# Patient Record
Sex: FEMALE | Race: WHITE
[De-identification: names, ages, dates, MRNs, and addresses within clinical notes are randomized per-mention and may not be internally consistent; named-entity substitution may affect disease eponyms.]

---

## 2018-03-14 ENCOUNTER — HOSPITAL ENCOUNTER (EMERGENCY)
Age: 53
Discharge: HOME | End: 2018-03-14
Payer: COMMERCIAL

## 2018-03-14 VITALS — BODY MASS INDEX: 33.8 KG/M2

## 2018-03-14 VITALS
SYSTOLIC BLOOD PRESSURE: 121 MMHG | RESPIRATION RATE: 16 BRPM | DIASTOLIC BLOOD PRESSURE: 72 MMHG | OXYGEN SATURATION: 98 % | HEART RATE: 84 BPM

## 2018-03-14 VITALS
SYSTOLIC BLOOD PRESSURE: 162 MMHG | HEART RATE: 99 BPM | RESPIRATION RATE: 18 BRPM | OXYGEN SATURATION: 100 % | DIASTOLIC BLOOD PRESSURE: 130 MMHG | TEMPERATURE: 97.3 F

## 2018-03-14 VITALS — SYSTOLIC BLOOD PRESSURE: 128 MMHG | DIASTOLIC BLOOD PRESSURE: 89 MMHG

## 2018-03-14 DIAGNOSIS — M77.32: ICD-10-CM
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
## 2018-03-14 DIAGNOSIS — S92.355A: Primary | ICD-10-CM
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
## 2018-03-14 DIAGNOSIS — Z79.899: ICD-10-CM

## 2018-03-14 DIAGNOSIS — M19.072: ICD-10-CM

## 2018-03-14 DIAGNOSIS — G43.909: ICD-10-CM
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
## 2018-03-14 PROCEDURE — 99283 EMERGENCY DEPT VISIT LOW MDM: CPT

## 2018-03-14 PROCEDURE — 73630 X-RAY EXAM OF FOOT: CPT

## 2018-04-27 ENCOUNTER — HOSPITAL ENCOUNTER (OUTPATIENT)
Age: 53
End: 2018-04-27
Payer: COMMERCIAL

## 2018-04-27 DIAGNOSIS — F17.200: ICD-10-CM

## 2018-04-27 DIAGNOSIS — Z01.818: Primary | ICD-10-CM

## 2018-04-27 DIAGNOSIS — Z01.810: ICD-10-CM

## 2018-04-27 LAB
ANION GAP: 7 (ref 5–15)
BUN SERPL-MCNC: 12 MG/DL (ref 7–18)
BUN/CREAT RATIO: 18.1 RATIO (ref 10–20)
CALCIUM SERPL-MCNC: 9 MG/DL (ref 8.5–10.1)
CARBON DIOXIDE: 26 MMOL/L (ref 21–32)
CHLORIDE: 103 MMOL/L (ref 98–107)
DEPRECATED RDW RBC: 43.4 FL (ref 35.1–43.9)
DIFFERENTIAL INDICATED: (no result)
ERYTHROCYTE [DISTWIDTH] IN BLOOD: 12.9 % (ref 11.6–14.6)
EST GLOM FILT RATE - AFR AMER: 119 ML/MIN (ref 60–?)
GLUCOSE: 85 MG/DL (ref 74–106)
HCT VFR BLD AUTO: 39.9 % (ref 37–47)
HEMOGLOBIN: 13.5 G/DL (ref 12–15)
HGB BLD-MCNC: 13.5 G/DL (ref 12–15)
IMMATURE GRANULOCYTES COUNT: 0 X10^3/UL (ref 0–0)
MCV RBC: 93.4 FL (ref 81–99)
MEAN CORP HGB CONC: 33.8 G/GL (ref 32–36)
MEAN PLATELET VOL.: 10.5 FL (ref 6.2–12)
PLATELET # BLD: 230 K/MM3 (ref 150–450)
PLATELET COUNT: 230 K/MM3 (ref 150–450)
POSITIVE COUNT: NO
POSITIVE DIFFERENTIAL: NO
POSITIVE MORPHOLOGY: NO
POTASSIUM: 4.3 MMOL/L (ref 3.5–5.1)
RBC # BLD AUTO: 4.27 M/MM3 (ref 4.2–5.4)
RBC DISTRIBUTION WIDTH CV: 12.9 % (ref 11.6–14.6)
RBC DISTRIBUTION WIDTH SD: 43.4 FL (ref 35.1–43.9)
WBC # BLD AUTO: 4.1 K/MM3 (ref 4.4–11)
WHITE BLOOD COUNT: 4.1 K/MM3 (ref 4.4–11)

## 2018-04-27 PROCEDURE — 36415 COLL VENOUS BLD VENIPUNCTURE: CPT

## 2018-04-27 PROCEDURE — 80048 BASIC METABOLIC PNL TOTAL CA: CPT

## 2018-04-27 PROCEDURE — 93005 ELECTROCARDIOGRAM TRACING: CPT

## 2018-04-27 PROCEDURE — 85025 COMPLETE CBC W/AUTO DIFF WBC: CPT

## 2018-04-27 PROCEDURE — 71046 X-RAY EXAM CHEST 2 VIEWS: CPT

## 2019-08-26 ENCOUNTER — HOSPITAL ENCOUNTER (OUTPATIENT)
Dept: HOSPITAL 100 - ED | Age: 54
Setting detail: OBSERVATION
LOS: 1 days | Discharge: HOME | End: 2019-08-27
Payer: COMMERCIAL

## 2019-08-26 VITALS
RESPIRATION RATE: 25 BRPM | SYSTOLIC BLOOD PRESSURE: 152 MMHG | DIASTOLIC BLOOD PRESSURE: 86 MMHG | OXYGEN SATURATION: 98 % | TEMPERATURE: 99.14 F | HEART RATE: 108 BPM

## 2019-08-26 VITALS — BODY MASS INDEX: 30.1 KG/M2 | BODY MASS INDEX: 29.5 KG/M2

## 2019-08-26 VITALS — HEART RATE: 90 BPM | SYSTOLIC BLOOD PRESSURE: 138 MMHG | DIASTOLIC BLOOD PRESSURE: 71 MMHG

## 2019-08-26 VITALS
SYSTOLIC BLOOD PRESSURE: 116 MMHG | DIASTOLIC BLOOD PRESSURE: 93 MMHG | OXYGEN SATURATION: 99 % | RESPIRATION RATE: 20 BRPM | HEART RATE: 80 BPM

## 2019-08-26 VITALS
RESPIRATION RATE: 16 BRPM | SYSTOLIC BLOOD PRESSURE: 124 MMHG | OXYGEN SATURATION: 98 % | HEART RATE: 80 BPM | DIASTOLIC BLOOD PRESSURE: 92 MMHG | TEMPERATURE: 97.88 F

## 2019-08-26 VITALS
DIASTOLIC BLOOD PRESSURE: 86 MMHG | HEART RATE: 96 BPM | RESPIRATION RATE: 18 BRPM | OXYGEN SATURATION: 99 % | SYSTOLIC BLOOD PRESSURE: 143 MMHG

## 2019-08-26 VITALS
OXYGEN SATURATION: 98 % | TEMPERATURE: 98.06 F | SYSTOLIC BLOOD PRESSURE: 133 MMHG | RESPIRATION RATE: 18 BRPM | HEART RATE: 72 BPM | DIASTOLIC BLOOD PRESSURE: 79 MMHG

## 2019-08-26 VITALS
DIASTOLIC BLOOD PRESSURE: 71 MMHG | HEART RATE: 90 BPM | SYSTOLIC BLOOD PRESSURE: 138 MMHG | RESPIRATION RATE: 18 BRPM | OXYGEN SATURATION: 99 %

## 2019-08-26 VITALS — HEART RATE: 69 BPM

## 2019-08-26 VITALS — HEART RATE: 72 BPM

## 2019-08-26 VITALS — HEART RATE: 78 BPM

## 2019-08-26 VITALS — OXYGEN SATURATION: 98 %

## 2019-08-26 DIAGNOSIS — R00.2: ICD-10-CM

## 2019-08-26 DIAGNOSIS — R06.2: ICD-10-CM

## 2019-08-26 DIAGNOSIS — G25.81: ICD-10-CM

## 2019-08-26 DIAGNOSIS — G43.909: ICD-10-CM

## 2019-08-26 DIAGNOSIS — R07.89: Primary | ICD-10-CM

## 2019-08-26 DIAGNOSIS — E87.5: ICD-10-CM

## 2019-08-26 DIAGNOSIS — E78.5: ICD-10-CM

## 2019-08-26 DIAGNOSIS — Z79.899: ICD-10-CM

## 2019-08-26 DIAGNOSIS — M19.90: ICD-10-CM

## 2019-08-26 DIAGNOSIS — Z87.891: ICD-10-CM

## 2019-08-26 DIAGNOSIS — Z82.49: ICD-10-CM

## 2019-08-26 LAB
ANION GAP: 4 (ref 5–15)
BUN SERPL-MCNC: 16 MG/DL (ref 7–18)
BUN/CREAT RATIO: 21.6 RATIO (ref 10–20)
CALCIUM SERPL-MCNC: 9.7 MG/DL (ref 8.5–10.1)
CARBON DIOXIDE: 24 MMOL/L (ref 21–32)
CHLORIDE: 111 MMOL/L (ref 98–107)
DEPRECATED RDW RBC: 49.1 FL (ref 35.1–43.9)
ERYTHROCYTE [DISTWIDTH] IN BLOOD: 13.4 % (ref 11.6–14.6)
EST GLOM FILT RATE - AFR AMER: 104 ML/MIN (ref 60–?)
ESTIMATED CREATININE CLEARANCE: 67.14 ML/MIN
GLUCOSE: 92 MG/DL (ref 74–106)
HCT VFR BLD AUTO: 50.5 % (ref 37–47)
HEMOGLOBIN: 16.8 G/DL (ref 12–15)
HGB BLD-MCNC: 16.8 G/DL (ref 12–15)
IMMATURE GRANULOCYTES COUNT: 0.03 X10^3/UL (ref 0–0)
MCV RBC: 96.7 FL (ref 81–99)
MEAN CORP HGB CONC: 33.3 G/DL (ref 32–36)
MEAN PLATELET VOL.: 9.8 FL (ref 6.2–12)
NRBC FLAGGED BY ANALYZER: 0 % (ref 0–5)
PLATELET # BLD: 251 K/MM3 (ref 150–450)
PLATELET COUNT: 251 K/MM3 (ref 150–450)
POTASSIUM: 3.4 MMOL/L (ref 3.5–5.1)
POTASSIUM: 5.5 MMOL/L (ref 3.5–5.1)
RBC # BLD AUTO: 5.22 M/MM3 (ref 4.2–5.4)
RBC DISTRIBUTION WIDTH CV: 13.4 % (ref 11.6–14.6)
RBC DISTRIBUTION WIDTH SD: 49.1 FL (ref 35.1–43.9)
WBC # BLD AUTO: 6.5 K/MM3 (ref 4.4–11)
WHITE BLOOD COUNT: 6.5 K/MM3 (ref 4.4–11)

## 2019-08-26 PROCEDURE — G0378 HOSPITAL OBSERVATION PER HR: HCPCS

## 2019-08-26 PROCEDURE — 96361 HYDRATE IV INFUSION ADD-ON: CPT

## 2019-08-26 PROCEDURE — 84132 ASSAY OF SERUM POTASSIUM: CPT

## 2019-08-26 PROCEDURE — 96372 THER/PROPH/DIAG INJ SC/IM: CPT

## 2019-08-26 PROCEDURE — 96360 HYDRATION IV INFUSION INIT: CPT

## 2019-08-26 PROCEDURE — 93017 CV STRESS TEST TRACING ONLY: CPT

## 2019-08-26 PROCEDURE — 80048 BASIC METABOLIC PNL TOTAL CA: CPT

## 2019-08-26 PROCEDURE — A4216 STERILE WATER/SALINE, 10 ML: HCPCS

## 2019-08-26 PROCEDURE — 85025 COMPLETE CBC W/AUTO DIFF WBC: CPT

## 2019-08-26 PROCEDURE — 99285 EMERGENCY DEPT VISIT HI MDM: CPT

## 2019-08-26 PROCEDURE — 99218: CPT

## 2019-08-26 PROCEDURE — 93005 ELECTROCARDIOGRAM TRACING: CPT

## 2019-08-26 PROCEDURE — A9500 TC99M SESTAMIBI: HCPCS

## 2019-08-26 PROCEDURE — 78452 HT MUSCLE IMAGE SPECT MULT: CPT

## 2019-08-26 PROCEDURE — 80061 LIPID PANEL: CPT

## 2019-08-26 PROCEDURE — 84484 ASSAY OF TROPONIN QUANT: CPT

## 2019-08-26 PROCEDURE — 36415 COLL VENOUS BLD VENIPUNCTURE: CPT

## 2019-08-26 PROCEDURE — 71045 X-RAY EXAM CHEST 1 VIEW: CPT

## 2019-08-26 RX ADMIN — NITROGLYCERIN 0.4 MG: 0.4 TABLET, ORALLY DISINTEGRATING SUBLINGUAL at 16:43

## 2019-08-26 RX ADMIN — SODIUM CHLORIDE 150 ML: 9 INJECTION, SOLUTION INTRAVENOUS at 16:34

## 2019-08-26 RX ADMIN — NITROGLYCERIN 0.4 MG: 0.4 TABLET, ORALLY DISINTEGRATING SUBLINGUAL at 16:35

## 2019-08-26 RX ADMIN — NITROGLYCERIN 0.4 MG: 0.4 TABLET, ORALLY DISINTEGRATING SUBLINGUAL at 16:51

## 2019-08-26 RX ADMIN — NITROGLYCERIN 0.4 MG: 0.4 TABLET, ORALLY DISINTEGRATING SUBLINGUAL at 22:55

## 2019-08-26 RX ADMIN — SODIUM CHLORIDE 100 ML: 9 INJECTION, SOLUTION INTRAVENOUS at 18:17

## 2019-08-27 VITALS
RESPIRATION RATE: 16 BRPM | HEART RATE: 73 BPM | OXYGEN SATURATION: 99 % | DIASTOLIC BLOOD PRESSURE: 72 MMHG | SYSTOLIC BLOOD PRESSURE: 126 MMHG | TEMPERATURE: 97.1 F

## 2019-08-27 VITALS
DIASTOLIC BLOOD PRESSURE: 69 MMHG | SYSTOLIC BLOOD PRESSURE: 121 MMHG | OXYGEN SATURATION: 97 % | HEART RATE: 81 BPM | TEMPERATURE: 97.3 F | RESPIRATION RATE: 14 BRPM

## 2019-08-27 VITALS
SYSTOLIC BLOOD PRESSURE: 121 MMHG | DIASTOLIC BLOOD PRESSURE: 76 MMHG | HEART RATE: 86 BPM | OXYGEN SATURATION: 99 % | TEMPERATURE: 98.24 F | RESPIRATION RATE: 16 BRPM

## 2019-08-27 VITALS — HEART RATE: 97 BPM

## 2019-08-27 VITALS — HEART RATE: 83 BPM

## 2019-08-27 VITALS
TEMPERATURE: 97.88 F | SYSTOLIC BLOOD PRESSURE: 124 MMHG | RESPIRATION RATE: 16 BRPM | OXYGEN SATURATION: 100 % | DIASTOLIC BLOOD PRESSURE: 78 MMHG | HEART RATE: 90 BPM

## 2019-08-27 VITALS — HEART RATE: 65 BPM

## 2019-08-27 LAB
ANION GAP: 6 (ref 5–15)
BUN SERPL-MCNC: 9 MG/DL (ref 7–18)
BUN/CREAT RATIO: 16.4 RATIO (ref 10–20)
CALCIUM SERPL-MCNC: 8.9 MG/DL (ref 8.5–10.1)
CARBON DIOXIDE: 21 MMOL/L (ref 21–32)
CHLORIDE: 118 MMOL/L (ref 98–107)
CHOLEST SERPL-MCNC: 200 MG/DL
DEPRECATED RDW RBC: 48.4 FL (ref 35.1–43.9)
ERYTHROCYTE [DISTWIDTH] IN BLOOD: 13.5 % (ref 11.6–14.6)
EST GLOM FILT RATE - AFR AMER: 147 ML/MIN (ref 60–?)
ESTIMATED CREATININE CLEARANCE: 90.33 ML/MIN
GLUCOSE: 92 MG/DL (ref 74–106)
HCT VFR BLD AUTO: 44.4 % (ref 37–47)
HEMOGLOBIN: 14.7 G/DL (ref 12–15)
HGB BLD-MCNC: 14.7 G/DL (ref 12–15)
IMMATURE GRANULOCYTES COUNT: 0.02 X10^3/UL (ref 0–0)
MCV RBC: 97.4 FL (ref 81–99)
MEAN CORP HGB CONC: 33.1 G/DL (ref 32–36)
MEAN PLATELET VOL.: 9.7 FL (ref 6.2–12)
NRBC FLAGGED BY ANALYZER: 0 % (ref 0–5)
PLATELET # BLD: 219 K/MM3 (ref 150–450)
PLATELET COUNT: 219 K/MM3 (ref 150–450)
POTASSIUM: 4.4 MMOL/L (ref 3.5–5.1)
RBC # BLD AUTO: 4.56 M/MM3 (ref 4.2–5.4)
RBC DISTRIBUTION WIDTH CV: 13.5 % (ref 11.6–14.6)
RBC DISTRIBUTION WIDTH SD: 48.4 FL (ref 35.1–43.9)
TRIGLYCERIDES: 101 MG/DL
VLDLC SERPL-MCNC: 20 MG/DL (ref 5–40)
WBC # BLD AUTO: 5.2 K/MM3 (ref 4.4–11)
WHITE BLOOD COUNT: 5.2 K/MM3 (ref 4.4–11)

## 2019-08-27 RX ADMIN — ASPIRIN 81 MG: 81 TABLET, COATED ORAL at 05:45

## 2019-09-04 ENCOUNTER — HOSPITAL ENCOUNTER (OUTPATIENT)
Age: 54
End: 2019-09-04
Payer: COMMERCIAL

## 2019-09-04 VITALS — BODY MASS INDEX: 29.5 KG/M2

## 2019-09-04 DIAGNOSIS — Z51.81: Primary | ICD-10-CM

## 2019-09-04 LAB
ALANINE AMINOTRANSFER ALT/SGPT: 28 U/L (ref 13–56)
ALBUMIN SERPL-MCNC: 3.3 G/DL (ref 3.2–5)
ALKALINE PHOSPHATASE: 68 U/L (ref 45–117)
AST(SGOT): 18 U/L (ref 15–37)
BILIRUB DIRECT SERPL-MCNC: 0.06 MG/DL (ref 0–0.3)
GLOBULIN: 3.4 G/DL (ref 2.2–4.2)

## 2019-09-04 PROCEDURE — 80076 HEPATIC FUNCTION PANEL: CPT

## 2019-09-04 PROCEDURE — 36415 COLL VENOUS BLD VENIPUNCTURE: CPT

## 2020-11-24 ENCOUNTER — HOSPITAL ENCOUNTER (OUTPATIENT)
Dept: HOSPITAL 100 - PT | Age: 55
Discharge: HOME | End: 2020-11-24
Payer: COMMERCIAL

## 2020-11-24 VITALS — BODY MASS INDEX: 29.5 KG/M2

## 2020-11-24 DIAGNOSIS — Z47.1: Primary | ICD-10-CM

## 2020-11-24 DIAGNOSIS — M17.11: ICD-10-CM

## 2020-11-24 PROCEDURE — 97530 THERAPEUTIC ACTIVITIES: CPT

## 2020-11-24 PROCEDURE — 97164 PT RE-EVAL EST PLAN CARE: CPT

## 2020-11-24 PROCEDURE — 97110 THERAPEUTIC EXERCISES: CPT

## 2020-11-24 PROCEDURE — 97162 PT EVAL MOD COMPLEX 30 MIN: CPT

## 2021-09-03 ENCOUNTER — HOSPITAL ENCOUNTER (OUTPATIENT)
Age: 56
End: 2021-09-03
Payer: COMMERCIAL

## 2021-09-03 DIAGNOSIS — R09.81: Primary | ICD-10-CM

## 2021-09-03 PROCEDURE — 87635 SARS-COV-2 COVID-19 AMP PRB: CPT

## 2021-09-03 PROCEDURE — U0003 INFECTIOUS AGENT DETECTION BY NUCLEIC ACID (DNA OR RNA); SEVERE ACUTE RESPIRATORY SYNDROME CORONAVIRUS 2 (SARS-COV-2) (CORONAVIRUS DISEASE [COVID-19]), AMPLIFIED PROBE TECHNIQUE, MAKING USE OF HIGH THROUGHPUT TECHNOLOGIES AS DESCRIBED BY CMS-2020-01-R: HCPCS

## 2021-09-03 PROCEDURE — U0005 INFEC AGEN DETEC AMPLI PROBE: HCPCS

## 2021-12-21 ENCOUNTER — HOSPITAL ENCOUNTER (OUTPATIENT)
Age: 56
End: 2021-12-21
Payer: COMMERCIAL

## 2021-12-21 DIAGNOSIS — Z11.52: Primary | ICD-10-CM

## 2021-12-21 PROCEDURE — U0003 INFECTIOUS AGENT DETECTION BY NUCLEIC ACID (DNA OR RNA); SEVERE ACUTE RESPIRATORY SYNDROME CORONAVIRUS 2 (SARS-COV-2) (CORONAVIRUS DISEASE [COVID-19]), AMPLIFIED PROBE TECHNIQUE, MAKING USE OF HIGH THROUGHPUT TECHNOLOGIES AS DESCRIBED BY CMS-2020-01-R: HCPCS

## 2021-12-21 PROCEDURE — U0005 INFEC AGEN DETEC AMPLI PROBE: HCPCS

## 2021-12-21 PROCEDURE — 87635 SARS-COV-2 COVID-19 AMP PRB: CPT

## 2021-12-24 ENCOUNTER — HOSPITAL ENCOUNTER (EMERGENCY)
Age: 56
Discharge: HOME | End: 2021-12-24
Payer: COMMERCIAL

## 2021-12-24 VITALS — HEART RATE: 107 BPM | RESPIRATION RATE: 14 BRPM

## 2021-12-24 VITALS
SYSTOLIC BLOOD PRESSURE: 177 MMHG | TEMPERATURE: 97.8 F | HEART RATE: 131 BPM | RESPIRATION RATE: 28 BRPM | OXYGEN SATURATION: 96 % | DIASTOLIC BLOOD PRESSURE: 96 MMHG

## 2021-12-24 VITALS — RESPIRATION RATE: 20 BRPM | HEART RATE: 106 BPM | OXYGEN SATURATION: 93 %

## 2021-12-24 VITALS — OXYGEN SATURATION: 96 %

## 2021-12-24 VITALS — BODY MASS INDEX: 34.4 KG/M2

## 2021-12-24 VITALS — OXYGEN SATURATION: 94 %

## 2021-12-24 DIAGNOSIS — J20.9: Primary | ICD-10-CM

## 2021-12-24 DIAGNOSIS — Z87.891: ICD-10-CM

## 2021-12-24 PROCEDURE — 99282 EMERGENCY DEPT VISIT SF MDM: CPT

## 2021-12-24 PROCEDURE — 94640 AIRWAY INHALATION TREATMENT: CPT

## 2021-12-24 PROCEDURE — 71046 X-RAY EXAM CHEST 2 VIEWS: CPT

## 2022-01-04 ENCOUNTER — HOSPITAL ENCOUNTER (OUTPATIENT)
Dept: HOSPITAL 100 - LABSPEC | Age: 57
Discharge: ANOTHER HOSPITAL | End: 2022-01-04
Payer: COMMERCIAL

## 2022-01-04 DIAGNOSIS — Z11.52: Primary | ICD-10-CM

## 2022-01-04 PROCEDURE — 87635 SARS-COV-2 COVID-19 AMP PRB: CPT

## 2022-01-04 PROCEDURE — U0005 INFEC AGEN DETEC AMPLI PROBE: HCPCS

## 2022-01-04 PROCEDURE — U0003 INFECTIOUS AGENT DETECTION BY NUCLEIC ACID (DNA OR RNA); SEVERE ACUTE RESPIRATORY SYNDROME CORONAVIRUS 2 (SARS-COV-2) (CORONAVIRUS DISEASE [COVID-19]), AMPLIFIED PROBE TECHNIQUE, MAKING USE OF HIGH THROUGHPUT TECHNOLOGIES AS DESCRIBED BY CMS-2020-01-R: HCPCS

## 2022-09-20 ENCOUNTER — HOSPITAL ENCOUNTER (OUTPATIENT)
Dept: HOSPITAL 100 - EN | Age: 57
Discharge: HOME | End: 2022-09-20
Payer: COMMERCIAL

## 2022-09-20 VITALS
HEART RATE: 78 BPM | OXYGEN SATURATION: 98 % | SYSTOLIC BLOOD PRESSURE: 145 MMHG | RESPIRATION RATE: 16 BRPM | DIASTOLIC BLOOD PRESSURE: 76 MMHG

## 2022-09-20 VITALS
TEMPERATURE: 97.52 F | HEART RATE: 70 BPM | OXYGEN SATURATION: 100 % | RESPIRATION RATE: 16 BRPM | SYSTOLIC BLOOD PRESSURE: 120 MMHG | DIASTOLIC BLOOD PRESSURE: 83 MMHG

## 2022-09-20 VITALS
TEMPERATURE: 98.6 F | OXYGEN SATURATION: 100 % | RESPIRATION RATE: 16 BRPM | SYSTOLIC BLOOD PRESSURE: 145 MMHG | DIASTOLIC BLOOD PRESSURE: 83 MMHG | HEART RATE: 73 BPM

## 2022-09-20 VITALS
OXYGEN SATURATION: 94 % | TEMPERATURE: 98.06 F | HEART RATE: 90 BPM | RESPIRATION RATE: 16 BRPM | DIASTOLIC BLOOD PRESSURE: 77 MMHG | SYSTOLIC BLOOD PRESSURE: 136 MMHG

## 2022-09-20 VITALS
OXYGEN SATURATION: 97 % | RESPIRATION RATE: 16 BRPM | DIASTOLIC BLOOD PRESSURE: 65 MMHG | HEART RATE: 85 BPM | SYSTOLIC BLOOD PRESSURE: 145 MMHG

## 2022-09-20 VITALS — DIASTOLIC BLOOD PRESSURE: 83 MMHG | SYSTOLIC BLOOD PRESSURE: 145 MMHG

## 2022-09-20 VITALS — BODY MASS INDEX: 33.2 KG/M2

## 2022-09-20 DIAGNOSIS — K44.9: ICD-10-CM

## 2022-09-20 DIAGNOSIS — K21.00: Primary | ICD-10-CM

## 2022-09-20 DIAGNOSIS — Z79.899: ICD-10-CM

## 2022-09-20 DIAGNOSIS — K25.9: ICD-10-CM

## 2022-09-20 DIAGNOSIS — Z87.891: ICD-10-CM

## 2022-09-20 DIAGNOSIS — K29.50: ICD-10-CM

## 2022-09-20 PROCEDURE — 0DJ08ZZ INSPECTION OF UPPER INTESTINAL TRACT, VIA NATURAL OR ARTIFICIAL OPENING ENDOSCOPIC: ICD-10-PCS | Performed by: INTERNAL MEDICINE

## 2022-09-20 PROCEDURE — 88305 TISSUE EXAM BY PATHOLOGIST: CPT

## 2022-09-20 PROCEDURE — 43239 EGD BIOPSY SINGLE/MULTIPLE: CPT

## 2022-09-20 PROCEDURE — 88342 IMHCHEM/IMCYTCHM 1ST ANTB: CPT

## 2022-09-20 PROCEDURE — 88313 SPECIAL STAINS GROUP 2: CPT

## 2022-10-24 ENCOUNTER — HOSPITAL ENCOUNTER (OUTPATIENT)
Dept: HOSPITAL 100 - LAB | Age: 57
Discharge: HOME | End: 2022-10-24
Payer: COMMERCIAL

## 2022-10-24 DIAGNOSIS — R10.13: Primary | ICD-10-CM

## 2022-10-24 LAB
ALANINE AMINOTRANSFER ALT/SGPT: 25 U/L (ref 13–56)
ALBUMIN SERPL-MCNC: 3.5 G/DL (ref 3.2–5)
ALKALINE PHOSPHATASE: 88 U/L (ref 45–117)
AMYLASE SERPL-CCNC: 57 U/L (ref 25–115)
ANION GAP: 7 (ref 5–15)
AST(SGOT): 27 U/L (ref 15–37)
BUN SERPL-MCNC: 16 MG/DL (ref 7–18)
BUN/CREAT RATIO: 23.4 RATIO (ref 10–20)
CALCIUM SERPL-MCNC: 9.2 MG/DL (ref 8.5–10.1)
CARBON DIOXIDE: 26 MMOL/L (ref 21–32)
CHLORIDE: 103 MMOL/L (ref 98–107)
DEPRECATED RDW RBC: 63.7 FL (ref 35.1–43.9)
ERYTHROCYTE [DISTWIDTH] IN BLOOD: 19.9 % (ref 11.6–14.6)
EST GLOM FILT RATE - AFR AMER: 113 ML/MIN (ref 60–?)
FERRITIN SERPL-MCNC: 4 NG/ML (ref 8–252)
GLOBULIN: 3.7 G/DL (ref 2.2–4.2)
GLUCOSE: 104 MG/DL (ref 74–106)
HCT VFR BLD AUTO: 41 % (ref 37–47)
HEMOGLOBIN: 13.3 G/DL (ref 12–15)
HGB BLD-MCNC: 13.3 G/DL (ref 12–15)
IMMATURE GRANULOCYTES COUNT: 0.01 X10^3/UL (ref 0–0)
IRON BINDING CAPACITY,TOTAL: 386 UG/DL (ref 250–450)
IRON SATN MFR SERPL: 44.3 % (ref 15–55)
IRON SPEC-MCNT: 171 UG/DL (ref 50–170)
LIPASE: 297 U/L (ref 73–393)
MCV RBC: 87 FL (ref 81–99)
MEAN CORP HGB CONC: 32.4 G/DL (ref 32–36)
MEAN PLATELET VOL.: 11 FL (ref 6.2–12)
NRBC FLAGGED BY ANALYZER: 0 % (ref 0–5)
PLATELET # BLD: 272 K/MM3 (ref 150–450)
PLATELET COUNT: 272 K/MM3 (ref 150–450)
POTASSIUM: 3.7 MMOL/L (ref 3.5–5.1)
RBC # BLD AUTO: 4.71 M/MM3 (ref 4.2–5.4)
RBC DISTRIBUTION WIDTH CV: 19.9 % (ref 11.6–14.6)
RBC DISTRIBUTION WIDTH SD: 63.7 FL (ref 35.1–43.9)
WBC # BLD AUTO: 6.3 K/MM3 (ref 4.4–11)
WHITE BLOOD COUNT: 6.3 K/MM3 (ref 4.4–11)

## 2022-10-24 PROCEDURE — 83550 IRON BINDING TEST: CPT

## 2022-10-24 PROCEDURE — 36415 COLL VENOUS BLD VENIPUNCTURE: CPT

## 2022-10-24 PROCEDURE — 85025 COMPLETE CBC W/AUTO DIFF WBC: CPT

## 2022-10-24 PROCEDURE — 80053 COMPREHEN METABOLIC PANEL: CPT

## 2022-10-24 PROCEDURE — 82150 ASSAY OF AMYLASE: CPT

## 2022-10-24 PROCEDURE — 83540 ASSAY OF IRON: CPT

## 2022-10-24 PROCEDURE — 82728 ASSAY OF FERRITIN: CPT

## 2022-10-24 PROCEDURE — 83690 ASSAY OF LIPASE: CPT

## 2022-11-29 ENCOUNTER — HOSPITAL ENCOUNTER (OUTPATIENT)
Age: 57
Discharge: HOME | End: 2022-11-29
Payer: COMMERCIAL

## 2022-11-29 DIAGNOSIS — R10.13: Primary | ICD-10-CM

## 2022-11-29 DIAGNOSIS — Z90.49: ICD-10-CM

## 2022-11-29 PROCEDURE — 76705 ECHO EXAM OF ABDOMEN: CPT

## 2022-12-29 ENCOUNTER — HOSPITAL ENCOUNTER (OUTPATIENT)
Dept: HOSPITAL 100 - EN | Age: 57
Discharge: HOME | End: 2022-12-29
Payer: COMMERCIAL

## 2022-12-29 VITALS
DIASTOLIC BLOOD PRESSURE: 63 MMHG | SYSTOLIC BLOOD PRESSURE: 143 MMHG | OXYGEN SATURATION: 98 % | HEART RATE: 93 BPM | RESPIRATION RATE: 18 BRPM

## 2022-12-29 VITALS
OXYGEN SATURATION: 95 % | SYSTOLIC BLOOD PRESSURE: 143 MMHG | TEMPERATURE: 96.98 F | RESPIRATION RATE: 18 BRPM | DIASTOLIC BLOOD PRESSURE: 83 MMHG | HEART RATE: 76 BPM

## 2022-12-29 VITALS
DIASTOLIC BLOOD PRESSURE: 83 MMHG | OXYGEN SATURATION: 96 % | HEART RATE: 71 BPM | SYSTOLIC BLOOD PRESSURE: 143 MMHG | RESPIRATION RATE: 18 BRPM

## 2022-12-29 VITALS
SYSTOLIC BLOOD PRESSURE: 143 MMHG | RESPIRATION RATE: 16 BRPM | OXYGEN SATURATION: 99 % | DIASTOLIC BLOOD PRESSURE: 83 MMHG | HEART RATE: 75 BPM | TEMPERATURE: 98.24 F

## 2022-12-29 VITALS — DIASTOLIC BLOOD PRESSURE: 83 MMHG | SYSTOLIC BLOOD PRESSURE: 143 MMHG

## 2022-12-29 VITALS
RESPIRATION RATE: 16 BRPM | DIASTOLIC BLOOD PRESSURE: 83 MMHG | OXYGEN SATURATION: 98 % | HEART RATE: 88 BPM | SYSTOLIC BLOOD PRESSURE: 143 MMHG

## 2022-12-29 VITALS
SYSTOLIC BLOOD PRESSURE: 143 MMHG | DIASTOLIC BLOOD PRESSURE: 68 MMHG | OXYGEN SATURATION: 96 % | TEMPERATURE: 97 F | RESPIRATION RATE: 18 BRPM | HEART RATE: 90 BPM

## 2022-12-29 VITALS — BODY MASS INDEX: 31 KG/M2

## 2022-12-29 DIAGNOSIS — K63.89: ICD-10-CM

## 2022-12-29 DIAGNOSIS — K64.9: ICD-10-CM

## 2022-12-29 DIAGNOSIS — Z79.899: ICD-10-CM

## 2022-12-29 DIAGNOSIS — D12.0: Primary | ICD-10-CM

## 2022-12-29 DIAGNOSIS — K21.9: ICD-10-CM

## 2022-12-29 DIAGNOSIS — Z80.0: ICD-10-CM

## 2022-12-29 DIAGNOSIS — Z87.891: ICD-10-CM

## 2022-12-29 PROCEDURE — 45380 COLONOSCOPY AND BIOPSY: CPT

## 2022-12-29 PROCEDURE — 0DJD8ZZ INSPECTION OF LOWER INTESTINAL TRACT, VIA NATURAL OR ARTIFICIAL OPENING ENDOSCOPIC: ICD-10-PCS | Performed by: INTERNAL MEDICINE

## 2022-12-29 PROCEDURE — 45385 COLONOSCOPY W/LESION REMOVAL: CPT

## 2022-12-29 PROCEDURE — 88305 TISSUE EXAM BY PATHOLOGIST: CPT

## 2023-04-11 ENCOUNTER — HOSPITAL ENCOUNTER (OUTPATIENT)
Age: 58
Discharge: HOME | End: 2023-04-11
Payer: COMMERCIAL

## 2023-04-11 DIAGNOSIS — R94.31: Primary | ICD-10-CM

## 2023-04-11 PROCEDURE — 93306 TTE W/DOPPLER COMPLETE: CPT

## 2023-10-06 ENCOUNTER — HOSPITAL ENCOUNTER (OUTPATIENT)
Dept: HOSPITAL 100 - EN | Age: 58
Discharge: HOME | End: 2023-10-06
Payer: COMMERCIAL

## 2023-10-06 VITALS
DIASTOLIC BLOOD PRESSURE: 82 MMHG | OXYGEN SATURATION: 94 % | HEART RATE: 86 BPM | RESPIRATION RATE: 16 BRPM | SYSTOLIC BLOOD PRESSURE: 113 MMHG | TEMPERATURE: 98 F

## 2023-10-06 VITALS
SYSTOLIC BLOOD PRESSURE: 105 MMHG | OXYGEN SATURATION: 96 % | DIASTOLIC BLOOD PRESSURE: 82 MMHG | HEART RATE: 82 BPM | RESPIRATION RATE: 16 BRPM

## 2023-10-06 VITALS
SYSTOLIC BLOOD PRESSURE: 105 MMHG | DIASTOLIC BLOOD PRESSURE: 82 MMHG | RESPIRATION RATE: 16 BRPM | OXYGEN SATURATION: 93 % | HEART RATE: 86 BPM

## 2023-10-06 VITALS
SYSTOLIC BLOOD PRESSURE: 117 MMHG | DIASTOLIC BLOOD PRESSURE: 82 MMHG | HEART RATE: 90 BPM | OXYGEN SATURATION: 92 % | RESPIRATION RATE: 16 BRPM | TEMPERATURE: 97.5 F

## 2023-10-06 VITALS
TEMPERATURE: 98.6 F | RESPIRATION RATE: 16 BRPM | DIASTOLIC BLOOD PRESSURE: 82 MMHG | SYSTOLIC BLOOD PRESSURE: 117 MMHG | OXYGEN SATURATION: 98 % | HEART RATE: 86 BPM

## 2023-10-06 VITALS — BODY MASS INDEX: 32.6 KG/M2

## 2023-10-06 VITALS — DIASTOLIC BLOOD PRESSURE: 82 MMHG | SYSTOLIC BLOOD PRESSURE: 117 MMHG

## 2023-10-06 DIAGNOSIS — K21.00: Primary | ICD-10-CM

## 2023-10-06 DIAGNOSIS — Z87.891: ICD-10-CM

## 2023-10-06 DIAGNOSIS — K31.89: ICD-10-CM

## 2023-10-06 DIAGNOSIS — Z80.0: ICD-10-CM

## 2023-10-06 DIAGNOSIS — Z79.899: ICD-10-CM

## 2023-10-06 LAB
CRP SERPL-MCNC: < 2.9 MG/L (ref 0–3)
DEPRECATED RDW RBC: 49.8 FL (ref 35.1–43.9)
ERYTHROCYTE [DISTWIDTH] IN BLOOD: 14.4 % (ref 11.6–14.6)
HCT VFR BLD AUTO: 40.4 % (ref 37–47)
HEMOGLOBIN: 12.9 G/DL (ref 12–15)
HGB BLD-MCNC: 12.9 G/DL (ref 12–15)
IMMATURE GRANULOCYTES COUNT: 0.02 X10^3/UL (ref 0–0)
MCV RBC: 94.2 FL (ref 81–99)
MEAN CORP HGB CONC: 31.9 G/DL (ref 32–36)
MEAN PLATELET VOL.: 9.7 FL (ref 6.2–12)
NRBC FLAGGED BY ANALYZER: 0 % (ref 0–5)
PLATELET # BLD: 261 K/MM3 (ref 150–450)
PLATELET COUNT: 261 K/MM3 (ref 150–450)
RBC # BLD AUTO: 4.29 M/MM3 (ref 4.2–5.4)
RBC DISTRIBUTION WIDTH CV: 14.4 % (ref 11.6–14.6)
RBC DISTRIBUTION WIDTH SD: 49.8 FL (ref 35.1–43.9)
WBC # BLD AUTO: 7.7 K/MM3 (ref 4.4–11)
WHITE BLOOD COUNT: 7.7 K/MM3 (ref 4.4–11)

## 2023-10-06 PROCEDURE — 36415 COLL VENOUS BLD VENIPUNCTURE: CPT

## 2023-10-06 PROCEDURE — 85652 RBC SED RATE AUTOMATED: CPT

## 2023-10-06 PROCEDURE — 0DJ08ZZ INSPECTION OF UPPER INTESTINAL TRACT, VIA NATURAL OR ARTIFICIAL OPENING ENDOSCOPIC: ICD-10-PCS | Performed by: INTERNAL MEDICINE

## 2023-10-06 PROCEDURE — 86140 C-REACTIVE PROTEIN: CPT

## 2023-10-06 PROCEDURE — 88305 TISSUE EXAM BY PATHOLOGIST: CPT

## 2023-10-06 PROCEDURE — 88313 SPECIAL STAINS GROUP 2: CPT

## 2023-10-06 PROCEDURE — 43239 EGD BIOPSY SINGLE/MULTIPLE: CPT

## 2023-10-06 PROCEDURE — 85025 COMPLETE CBC W/AUTO DIFF WBC: CPT

## 2023-11-08 ENCOUNTER — HOSPITAL ENCOUNTER (OUTPATIENT)
Dept: HOSPITAL 100 - PT | Age: 58
Discharge: HOME | End: 2023-11-08
Payer: COMMERCIAL

## 2023-11-08 ENCOUNTER — SPECIALTY PHARMACY (OUTPATIENT)
Dept: PHARMACY | Facility: CLINIC | Age: 58
End: 2023-11-08

## 2023-11-08 ENCOUNTER — PHARMACY VISIT (OUTPATIENT)
Dept: PHARMACY | Facility: CLINIC | Age: 58
End: 2023-11-08
Payer: COMMERCIAL

## 2023-11-08 DIAGNOSIS — T84.062D: Primary | ICD-10-CM

## 2023-11-08 PROCEDURE — 97162 PT EVAL MOD COMPLEX 30 MIN: CPT

## 2023-11-08 PROCEDURE — 97530 THERAPEUTIC ACTIVITIES: CPT

## 2023-11-08 PROCEDURE — 97110 THERAPEUTIC EXERCISES: CPT

## 2023-11-08 PROCEDURE — RXMED WILLOW AMBULATORY MEDICATION CHARGE

## 2023-11-30 ENCOUNTER — TELEPHONE (OUTPATIENT)
Dept: NEUROLOGY | Facility: CLINIC | Age: 58
End: 2023-11-30

## 2023-11-30 ENCOUNTER — OFFICE VISIT (OUTPATIENT)
Dept: NEUROLOGY | Facility: CLINIC | Age: 58
End: 2023-11-30
Payer: COMMERCIAL

## 2023-11-30 VITALS — HEART RATE: 80 BPM | RESPIRATION RATE: 16 BRPM | SYSTOLIC BLOOD PRESSURE: 104 MMHG | DIASTOLIC BLOOD PRESSURE: 74 MMHG

## 2023-11-30 DIAGNOSIS — G25.81 RESTLESS LEG SYNDROME: ICD-10-CM

## 2023-11-30 DIAGNOSIS — G43.711 CHRONIC MIGRAINE WITHOUT AURA, INTRACTABLE, WITH STATUS MIGRAINOSUS: ICD-10-CM

## 2023-11-30 DIAGNOSIS — G43.109 ACEPHALGIC MIGRAINE: ICD-10-CM

## 2023-11-30 DIAGNOSIS — G43.711 CHRONIC MIGRAINE WITHOUT AURA, INTRACTABLE, WITH STATUS MIGRAINOSUS: Primary | ICD-10-CM

## 2023-11-30 DIAGNOSIS — G47.00 INSOMNIA, UNSPECIFIED TYPE: ICD-10-CM

## 2023-11-30 PROCEDURE — 99214 OFFICE O/P EST MOD 30 MIN: CPT | Performed by: PSYCHIATRY & NEUROLOGY

## 2023-11-30 PROCEDURE — 1036F TOBACCO NON-USER: CPT | Performed by: PSYCHIATRY & NEUROLOGY

## 2023-11-30 RX ORDER — OMEPRAZOLE 40 MG/1
CAPSULE, DELAYED RELEASE ORAL
COMMUNITY

## 2023-11-30 RX ORDER — ELETRIPTAN HYDROBROMIDE 40 MG/1
TABLET, FILM COATED ORAL
COMMUNITY
Start: 2021-10-18 | End: 2024-06-03

## 2023-11-30 RX ORDER — TIZANIDINE HYDROCHLORIDE 4 MG/1
CAPSULE, GELATIN COATED ORAL
COMMUNITY
Start: 2021-04-25 | End: 2023-11-30 | Stop reason: ALTCHOICE

## 2023-11-30 RX ORDER — PRAMIPEXOLE DIHYDROCHLORIDE 0.25 MG/1
0.25 TABLET ORAL
COMMUNITY
Start: 2017-02-01 | End: 2023-11-30 | Stop reason: SDUPTHER

## 2023-11-30 RX ORDER — CHOLECALCIFEROL (VITAMIN D3) 50 MCG
CAPSULE ORAL
COMMUNITY

## 2023-11-30 RX ORDER — TRAZODONE HYDROCHLORIDE 100 MG/1
100 TABLET ORAL NIGHTLY
Qty: 30 TABLET | Refills: 3 | Status: SHIPPED | OUTPATIENT
Start: 2023-11-30 | End: 2023-12-27

## 2023-11-30 RX ORDER — PRAMIPEXOLE DIHYDROCHLORIDE 1 MG/1
1 TABLET ORAL
Qty: 1 TABLET | Refills: 3 | Status: SHIPPED | OUTPATIENT
Start: 2023-11-30 | End: 2023-11-30 | Stop reason: SDUPTHER

## 2023-11-30 RX ORDER — ACETAMINOPHEN 500 MG
5 TABLET ORAL
COMMUNITY
Start: 2020-08-24

## 2023-11-30 RX ORDER — CARBOXYMETHYLCELLULOSE SODIUM, GLYCERIN, POLYSORBATE 80 5; 10; 5 MG/ML; MG/ML; MG/ML
SOLUTION/ DROPS OPHTHALMIC
COMMUNITY
Start: 2023-01-09

## 2023-11-30 RX ORDER — LANOLIN ALCOHOL/MO/W.PET/CERES
400 CREAM (GRAM) TOPICAL
COMMUNITY

## 2023-11-30 RX ORDER — TRAZODONE HYDROCHLORIDE 50 MG/1
50 TABLET ORAL NIGHTLY
COMMUNITY
Start: 2023-04-19 | End: 2023-11-30 | Stop reason: SDUPTHER

## 2023-11-30 RX ORDER — VIT C/E/ZN/COPPR/LUTEIN/ZEAXAN 250MG-90MG
CAPSULE ORAL
COMMUNITY
End: 2024-05-30 | Stop reason: SDUPTHER

## 2023-11-30 RX ORDER — SUMATRIPTAN SUCCINATE 3 MG/1
INJECTION, SOLUTION SUBCUTANEOUS
COMMUNITY

## 2023-11-30 RX ORDER — PRAMIPEXOLE DIHYDROCHLORIDE 1 MG/1
1 TABLET ORAL NIGHTLY
Qty: 30 TABLET | Refills: 3 | Status: SHIPPED | OUTPATIENT
Start: 2023-11-30 | End: 2023-12-27

## 2023-11-30 RX ORDER — MULTIVITAMIN
1 TABLET ORAL
COMMUNITY

## 2023-11-30 ASSESSMENT — PATIENT HEALTH QUESTIONNAIRE - PHQ9
1. LITTLE INTEREST OR PLEASURE IN DOING THINGS: NOT AT ALL
SUM OF ALL RESPONSES TO PHQ9 QUESTIONS 1 AND 2: 0
2. FEELING DOWN, DEPRESSED OR HOPELESS: NOT AT ALL

## 2023-11-30 NOTE — PATIENT INSTRUCTIONS
Increase mirapex .75 mg until gone then 1 mg   Increase trazadone to 100 mg.   Ok to treat headaches even if there is no pain ( acephalic headaches. )

## 2023-11-30 NOTE — PROGRESS NOTES
"Botox every 90 days. Last 2023    Face lift, and \"cut nerves in upper forehead \"2023 nerve decompression  Knee replacement revision 5 weeks ago  Dad  all within time frame after that.  A lot of stress.     Had no migraine from  until 2 weeks ago.,   Has experiencing 1-2 times per week associated symptoms of migraine but no head pain.   Eletriptan has relieved associated symptoms in 40 min.   No missed work or debility.     Continues Nurtec every other day  Weaned off of Trokendi early 2023    No migraine pain  Associated symptoms nausea, visual changes, \"weird feeling\" in her head Like a floaty, disconnected feeling.   Triggers in past have been stress, certain foods-pineapple, banana, chocolate    Trouble falling asleep. Wakes after 2-3 hours and difficult to fall back asleep.   Not new. Has been about 1 year. No longer taking Tizanidine. Melatonin as well, sleep maintenance is the issue.   Takes Mirapex for restless leg and trazodone 50mg for sleep.   Averages 4-6 hours. Not restful    Denies anxiety or depression currently.           "

## 2023-12-08 ENCOUNTER — HOSPITAL ENCOUNTER (OUTPATIENT)
Age: 58
Discharge: HOME | End: 2023-12-08
Payer: COMMERCIAL

## 2023-12-08 DIAGNOSIS — R10.9: Primary | ICD-10-CM

## 2023-12-08 PROCEDURE — 86003 ALLG SPEC IGE CRUDE XTRC EA: CPT

## 2023-12-08 PROCEDURE — 86005 ALLG SPEC IGE MULTIALLG SCR: CPT

## 2023-12-08 PROCEDURE — 74246 X-RAY XM UPR GI TRC 2CNTRST: CPT

## 2023-12-08 PROCEDURE — 74248 X-RAY SM INT F-THRU STD: CPT

## 2023-12-08 PROCEDURE — 36415 COLL VENOUS BLD VENIPUNCTURE: CPT

## 2023-12-12 LAB
MUSSELS: <0.1 KU/L
WHOLE EGG IGE QN: <0.1 KU/L

## 2023-12-13 LAB
CAT DANDER IGE QN: <0.1 KU/L
COMMON RAGWEED IGE QN: <0.1 KU/L
DOG EPITH IGE QN: <0.1 KU/L
KENT BLUE GRASS IGE QN: <0.1 KU/L
PLANTAIN, ENGLISH: <0.1 KU/L
WHITE ELM IGE QN: <0.1 KU/L
WHITE OAK IGE QN: <0.1 KU/L

## 2023-12-14 ENCOUNTER — TELEPHONE (OUTPATIENT)
Dept: NEUROLOGY | Facility: CLINIC | Age: 58
End: 2023-12-14
Payer: COMMERCIAL

## 2023-12-14 NOTE — TELEPHONE ENCOUNTER
Miladys reporting increase in trazadone has not helped sleep.   Getting about 5 hours and very restless.   ADVISE

## 2023-12-14 NOTE — TELEPHONE ENCOUNTER
Left message for Miladys on secure voicemail to increase Trazadone to 200mg. If needs new RX please call back. Office number left.

## 2023-12-18 ENCOUNTER — SPECIALTY PHARMACY (OUTPATIENT)
Dept: PHARMACY | Facility: CLINIC | Age: 58
End: 2023-12-18

## 2023-12-18 DIAGNOSIS — G43.711 CHRONIC MIGRAINE WITHOUT AURA, INTRACTABLE, WITH STATUS MIGRAINOSUS: ICD-10-CM

## 2023-12-19 PROCEDURE — RXMED WILLOW AMBULATORY MEDICATION CHARGE

## 2023-12-21 ENCOUNTER — SPECIALTY PHARMACY (OUTPATIENT)
Dept: PHARMACY | Facility: CLINIC | Age: 58
End: 2023-12-21

## 2023-12-24 DIAGNOSIS — G43.711 CHRONIC MIGRAINE WITHOUT AURA, INTRACTABLE, WITH STATUS MIGRAINOSUS: ICD-10-CM

## 2023-12-24 DIAGNOSIS — G25.81 RESTLESS LEG SYNDROME: ICD-10-CM

## 2023-12-27 RX ORDER — TRAZODONE HYDROCHLORIDE 100 MG/1
100 TABLET ORAL NIGHTLY
Qty: 90 TABLET | Refills: 3 | Status: SHIPPED | OUTPATIENT
Start: 2023-12-27 | End: 2024-02-26 | Stop reason: SDUPTHER

## 2023-12-27 RX ORDER — PRAMIPEXOLE DIHYDROCHLORIDE 1 MG/1
1 TABLET ORAL NIGHTLY
Qty: 90 TABLET | Refills: 3 | Status: SHIPPED | OUTPATIENT
Start: 2023-12-27 | End: 2024-12-26

## 2024-01-02 ENCOUNTER — PHARMACY VISIT (OUTPATIENT)
Dept: PHARMACY | Facility: CLINIC | Age: 59
End: 2024-01-02
Payer: COMMERCIAL

## 2024-01-31 ENCOUNTER — SPECIALTY PHARMACY (OUTPATIENT)
Dept: PHARMACY | Facility: CLINIC | Age: 59
End: 2024-01-31

## 2024-01-31 PROCEDURE — RXMED WILLOW AMBULATORY MEDICATION CHARGE

## 2024-02-06 ENCOUNTER — PHARMACY VISIT (OUTPATIENT)
Dept: PHARMACY | Facility: CLINIC | Age: 59
End: 2024-02-06
Payer: COMMERCIAL

## 2024-02-26 ENCOUNTER — TELEPHONE (OUTPATIENT)
Dept: NEUROLOGY | Facility: CLINIC | Age: 59
End: 2024-02-26
Payer: COMMERCIAL

## 2024-02-26 DIAGNOSIS — G43.711 CHRONIC MIGRAINE WITHOUT AURA, INTRACTABLE, WITH STATUS MIGRAINOSUS: ICD-10-CM

## 2024-02-26 RX ORDER — TRAZODONE HYDROCHLORIDE 100 MG/1
200 TABLET ORAL NIGHTLY
Qty: 180 TABLET | Refills: 3 | Status: SHIPPED | OUTPATIENT
Start: 2024-02-26 | End: 2024-05-30 | Stop reason: SDUPTHER

## 2024-02-26 NOTE — TELEPHONE ENCOUNTER
Called to refill traZODone (Desyrel) 100 mg tablet Miladys has been taking 200 mg so she is running out. Pharmacy is   [id number] Merged with [facility], [place] - [address] Phone: [phone]

## 2024-03-04 ENCOUNTER — HOSPITAL ENCOUNTER (OUTPATIENT)
Age: 59
Discharge: HOME | End: 2024-03-04
Payer: COMMERCIAL

## 2024-03-04 ENCOUNTER — SPECIALTY PHARMACY (OUTPATIENT)
Dept: PHARMACY | Facility: CLINIC | Age: 59
End: 2024-03-04

## 2024-03-04 DIAGNOSIS — R20.2: ICD-10-CM

## 2024-03-04 DIAGNOSIS — R00.0: Primary | ICD-10-CM

## 2024-03-04 DIAGNOSIS — R53.83: ICD-10-CM

## 2024-03-04 LAB
ALANINE AMINOTRANSFER ALT/SGPT: 23 U/L (ref 13–56)
ALBUMIN SERPL-MCNC: 3.7 G/DL (ref 3.2–5)
ALKALINE PHOSPHATASE: 75 U/L (ref 45–117)
ANION GAP: 4 (ref 5–15)
AST(SGOT): 20 U/L (ref 15–37)
BUN SERPL-MCNC: 22 MG/DL (ref 7–18)
BUN/CREAT RATIO: 31.6 RATIO (ref 10–20)
CALCIUM SERPL-MCNC: 9.2 MG/DL (ref 8.5–10.1)
CARBON DIOXIDE: 26 MMOL/L (ref 21–32)
CHLORIDE: 110 MMOL/L (ref 98–107)
DEPRECATED RDW RBC: 48.4 FL (ref 35.1–43.9)
ERYTHROCYTE [DISTWIDTH] IN BLOOD: 14.6 % (ref 11.6–14.6)
EST GLOM FILT RATE - AFR AMER: 110 ML/MIN (ref 60–?)
GLOBULIN: 3.7 G/DL (ref 2.2–4.2)
GLUCOSE: 97 MG/DL (ref 74–106)
HCT VFR BLD AUTO: 41.6 % (ref 37–47)
HGB BLD-MCNC: 13 G/DL (ref 12–15)
IMMATURE GRANULOCYTES COUNT: 0.01 X10^3/UL (ref 0–0)
MCV RBC: 91 FL (ref 81–99)
MEAN CORP HGB CONC: 31.3 G/DL (ref 32–36)
MEAN PLATELET VOL.: 10.8 FL (ref 6.2–12)
NRBC FLAGGED BY ANALYZER: 0 % (ref 0–5)
PLATELET # BLD: 290 K/MM3 (ref 150–450)
POTASSIUM: 4.5 MMOL/L (ref 3.5–5.1)
RBC # BLD AUTO: 4.57 M/MM3 (ref 4.2–5.4)
VITAMIN B12: 1023 PG/ML (ref 211–911)
WBC # BLD AUTO: 5.3 K/MM3 (ref 4.4–11)

## 2024-03-04 PROCEDURE — 36415 COLL VENOUS BLD VENIPUNCTURE: CPT

## 2024-03-04 PROCEDURE — 85025 COMPLETE CBC W/AUTO DIFF WBC: CPT

## 2024-03-04 PROCEDURE — 84439 ASSAY OF FREE THYROXINE: CPT

## 2024-03-04 PROCEDURE — 84443 ASSAY THYROID STIM HORMONE: CPT

## 2024-03-04 PROCEDURE — 80053 COMPREHEN METABOLIC PANEL: CPT

## 2024-03-04 PROCEDURE — 82607 VITAMIN B-12: CPT

## 2024-03-04 PROCEDURE — RXMED WILLOW AMBULATORY MEDICATION CHARGE

## 2024-03-05 ENCOUNTER — PHARMACY VISIT (OUTPATIENT)
Dept: PHARMACY | Facility: CLINIC | Age: 59
End: 2024-03-05
Payer: COMMERCIAL

## 2024-04-01 ENCOUNTER — SPECIALTY PHARMACY (OUTPATIENT)
Dept: PHARMACY | Facility: CLINIC | Age: 59
End: 2024-04-01

## 2024-04-01 ENCOUNTER — PHARMACY VISIT (OUTPATIENT)
Dept: PHARMACY | Facility: CLINIC | Age: 59
End: 2024-04-01
Payer: COMMERCIAL

## 2024-04-01 PROCEDURE — RXMED WILLOW AMBULATORY MEDICATION CHARGE

## 2024-04-26 ENCOUNTER — PHARMACY VISIT (OUTPATIENT)
Dept: PHARMACY | Facility: CLINIC | Age: 59
End: 2024-04-26
Payer: COMMERCIAL

## 2024-04-26 ENCOUNTER — SPECIALTY PHARMACY (OUTPATIENT)
Dept: PHARMACY | Facility: CLINIC | Age: 59
End: 2024-04-26

## 2024-04-26 PROCEDURE — RXMED WILLOW AMBULATORY MEDICATION CHARGE

## 2024-05-03 ENCOUNTER — HOSPITAL ENCOUNTER (OUTPATIENT)
Age: 59
Discharge: HOME | End: 2024-05-03
Payer: COMMERCIAL

## 2024-05-03 VITALS
RESPIRATION RATE: 16 BRPM | SYSTOLIC BLOOD PRESSURE: 112 MMHG | TEMPERATURE: 97.1 F | OXYGEN SATURATION: 99 % | DIASTOLIC BLOOD PRESSURE: 78 MMHG | HEART RATE: 83 BPM

## 2024-05-03 DIAGNOSIS — R10.13: Primary | ICD-10-CM

## 2024-05-03 PROCEDURE — F00ZJWZ INSTRUMENTAL SWALLOWING AND ORAL FUNCTION ASSESSMENT USING SWALLOWING EQUIPMENT: ICD-10-PCS | Performed by: INTERNAL MEDICINE

## 2024-05-03 PROCEDURE — 91010 ESOPHAGUS MOTILITY STUDY: CPT

## 2024-05-17 DIAGNOSIS — G43.711 CHRONIC MIGRAINE WITHOUT AURA, INTRACTABLE, WITH STATUS MIGRAINOSUS: ICD-10-CM

## 2024-05-17 DIAGNOSIS — G25.81 RESTLESS LEG SYNDROME: ICD-10-CM

## 2024-05-21 ENCOUNTER — SPECIALTY PHARMACY (OUTPATIENT)
Dept: PHARMACY | Facility: CLINIC | Age: 59
End: 2024-05-21

## 2024-05-22 RX ORDER — PRAMIPEXOLE DIHYDROCHLORIDE 1 MG/1
TABLET ORAL
Refills: 0 | OUTPATIENT
Start: 2024-05-22

## 2024-05-22 RX ORDER — TRAZODONE HYDROCHLORIDE 100 MG/1
200 TABLET ORAL NIGHTLY
Qty: 180 TABLET | Refills: 1 | OUTPATIENT
Start: 2024-05-22

## 2024-05-26 PROCEDURE — RXMED WILLOW AMBULATORY MEDICATION CHARGE

## 2024-05-28 ENCOUNTER — SPECIALTY PHARMACY (OUTPATIENT)
Dept: PHARMACY | Facility: CLINIC | Age: 59
End: 2024-05-28

## 2024-05-28 ENCOUNTER — PHARMACY VISIT (OUTPATIENT)
Dept: PHARMACY | Facility: CLINIC | Age: 59
End: 2024-05-28
Payer: COMMERCIAL

## 2024-05-29 DIAGNOSIS — G25.81 RESTLESS LEG SYNDROME: ICD-10-CM

## 2024-05-29 DIAGNOSIS — G43.711 CHRONIC MIGRAINE WITHOUT AURA, INTRACTABLE, WITH STATUS MIGRAINOSUS: ICD-10-CM

## 2024-05-30 ENCOUNTER — LAB (OUTPATIENT)
Dept: LAB | Facility: LAB | Age: 59
End: 2024-05-30
Payer: COMMERCIAL

## 2024-05-30 ENCOUNTER — OFFICE VISIT (OUTPATIENT)
Dept: NEUROLOGY | Facility: CLINIC | Age: 59
End: 2024-05-30
Payer: COMMERCIAL

## 2024-05-30 VITALS
SYSTOLIC BLOOD PRESSURE: 100 MMHG | WEIGHT: 186 LBS | HEART RATE: 104 BPM | HEIGHT: 62 IN | TEMPERATURE: 97.4 F | BODY MASS INDEX: 34.23 KG/M2 | RESPIRATION RATE: 20 BRPM | DIASTOLIC BLOOD PRESSURE: 74 MMHG

## 2024-05-30 DIAGNOSIS — G43.711 CHRONIC MIGRAINE WITHOUT AURA, INTRACTABLE, WITH STATUS MIGRAINOSUS: Primary | ICD-10-CM

## 2024-05-30 DIAGNOSIS — G43.711 CHRONIC MIGRAINE WITHOUT AURA, INTRACTABLE, WITH STATUS MIGRAINOSUS: ICD-10-CM

## 2024-05-30 PROCEDURE — 99213 OFFICE O/P EST LOW 20 MIN: CPT | Performed by: PSYCHIATRY & NEUROLOGY

## 2024-05-30 PROCEDURE — 83036 HEMOGLOBIN GLYCOSYLATED A1C: CPT

## 2024-05-30 PROCEDURE — 80053 COMPREHEN METABOLIC PANEL: CPT

## 2024-05-30 PROCEDURE — 36415 COLL VENOUS BLD VENIPUNCTURE: CPT

## 2024-05-30 RX ORDER — PRAMIPEXOLE DIHYDROCHLORIDE 1 MG/1
1 TABLET ORAL NIGHTLY
Qty: 30 TABLET | Refills: 0 | OUTPATIENT
Start: 2024-05-30

## 2024-05-30 RX ORDER — AMITRIPTYLINE HYDROCHLORIDE 25 MG/1
25 TABLET, FILM COATED ORAL NIGHTLY
COMMUNITY
Start: 2024-05-15

## 2024-05-30 RX ORDER — PHENTERMINE HYDROCHLORIDE 30 MG/1
CAPSULE ORAL
COMMUNITY
Start: 2024-05-17

## 2024-05-30 RX ORDER — TRAZODONE HYDROCHLORIDE 100 MG/1
100 TABLET ORAL NIGHTLY
Qty: 30 TABLET | Refills: 0 | OUTPATIENT
Start: 2024-05-30

## 2024-05-30 RX ORDER — TOPIRAMATE 50 MG/1
50 TABLET, FILM COATED ORAL DAILY
COMMUNITY
Start: 2024-05-14

## 2024-05-30 RX ORDER — AMOXICILLIN 500 MG/1
CAPSULE ORAL
COMMUNITY
Start: 2024-05-14 | End: 2024-05-30 | Stop reason: ALTCHOICE

## 2024-05-30 RX ORDER — ONDANSETRON 4 MG/1
TABLET, ORALLY DISINTEGRATING ORAL
COMMUNITY
Start: 2024-05-15 | End: 2024-05-30 | Stop reason: ALTCHOICE

## 2024-05-30 RX ORDER — OMEGA-3-ACID ETHYL ESTERS 1 G/1
CAPSULE, LIQUID FILLED ORAL
COMMUNITY
Start: 2024-04-19

## 2024-05-30 RX ORDER — DIAZEPAM 5 MG/1
TABLET ORAL
COMMUNITY
Start: 2024-05-19 | End: 2024-05-30 | Stop reason: ALTCHOICE

## 2024-05-30 ASSESSMENT — PATIENT HEALTH QUESTIONNAIRE - PHQ9
2. FEELING DOWN, DEPRESSED OR HOPELESS: NOT AT ALL
1. LITTLE INTEREST OR PLEASURE IN DOING THINGS: NOT AT ALL
SUM OF ALL RESPONSES TO PHQ9 QUESTIONS 1 AND 2: 0

## 2024-05-30 NOTE — PATIENT INSTRUCTIONS
Insomnia -stop trazodone if amitriptylline is helping you sleep  Dyspepsia and GERD- lets stop the nurtec to see if the stomach symptoms improve use eletripan for rescue. If headaches worsen when you go off the topamax for weight loss call me to restart.  Labs for tremor and dyspepsia  6 week follow up      [unfilled]  Problem List Items Addressed This Visit       Chronic migraine without aura, intractable, with status migrainosus - Primary    Relevant Orders    Hemoglobin A1C    Comprehensive Metabolic Panel

## 2024-05-30 NOTE — PROGRESS NOTES
"Stopped Botox after nerve decompression surgery 8-. Last Botox June 8, 2023.     Experiencing 2 migraines since last visit in November.   Associated symptoms light and noise sensitivity,nausea, visual aura of floaters and spots in eye but no head pain. Last 30 min. Does not treat  \"It is like a whole new life without migraines\"  Continues every other day Nurtec for prevention    Has developed a lot of stomach problems. GERD, Hiatal Hernia. Regurgitation into throat at night  Nauseated  A lot of left sided chest pain. Cardiac etiology ruled out.   GI ordered amitriptyline. Started 10 days ago. Has helped the regurgitation but still has mid  GI wants her to lose weight and added Topiramate and Phentermine. Has lost 14 lbs and goal is 30 lbs. Feels it will help GERD and chest pain    New hand tremors . Past 3 months.  Get worse at times but always present. Worse with intention. MD ordered valium as thought related to anxiety and was not helpful so stopped.     Sleeping well with amitriptyline and Trazodone. Averages 6.5-7 hours    Endorses anxiety. GI noted phentermine can increase \"edginess\"  Cries easily  Does not feel she is depressed, just anxious    Assess/plan    Insomnia -stop trazodone if amitriptylline is helping you sleep  Dyspepsia and GERD- lets stop the nurtec to see if the stomach symptoms improve use eletripan for rescue. If headaches worsen when you go off the topamax for weight loss call me to restart.  Labs for tremor and dyspepsia  6 week follow up    "

## 2024-05-31 LAB
ALBUMIN SERPL BCP-MCNC: 4.3 G/DL (ref 3.4–5)
ALP SERPL-CCNC: 74 U/L (ref 33–136)
ALT SERPL W P-5'-P-CCNC: 16 U/L (ref 7–45)
ANION GAP SERPL CALC-SCNC: 14 MMOL/L (ref 10–20)
AST SERPL W P-5'-P-CCNC: 15 U/L (ref 9–39)
BILIRUB SERPL-MCNC: 0.3 MG/DL (ref 0–1.2)
BUN SERPL-MCNC: 24 MG/DL (ref 6–23)
CALCIUM SERPL-MCNC: 9.7 MG/DL (ref 8.6–10.6)
CHLORIDE SERPL-SCNC: 108 MMOL/L (ref 98–107)
CO2 SERPL-SCNC: 23 MMOL/L (ref 21–32)
CREAT SERPL-MCNC: 0.66 MG/DL (ref 0.5–1.05)
EGFRCR SERPLBLD CKD-EPI 2021: >90 ML/MIN/1.73M*2
EST. AVERAGE GLUCOSE BLD GHB EST-MCNC: 108 MG/DL
GLUCOSE SERPL-MCNC: 91 MG/DL (ref 74–99)
HBA1C MFR BLD: 5.4 %
POTASSIUM SERPL-SCNC: 4.4 MMOL/L (ref 3.5–5.3)
PROT SERPL-MCNC: 6.9 G/DL (ref 6.4–8.2)
SODIUM SERPL-SCNC: 141 MMOL/L (ref 136–145)

## 2024-05-31 RX ORDER — PRAMIPEXOLE DIHYDROCHLORIDE 1 MG/1
1 TABLET ORAL NIGHTLY
Qty: 90 TABLET | Refills: 3 | OUTPATIENT
Start: 2024-05-31 | End: 2025-05-31

## 2024-05-31 RX ORDER — TRAZODONE HYDROCHLORIDE 100 MG/1
100 TABLET ORAL NIGHTLY
Qty: 90 TABLET | Refills: 3 | OUTPATIENT
Start: 2024-05-31 | End: 2025-05-31

## 2024-06-03 DIAGNOSIS — G43.711 CHRONIC MIGRAINE WITHOUT AURA, INTRACTABLE, WITH STATUS MIGRAINOSUS: ICD-10-CM

## 2024-06-03 RX ORDER — ELETRIPTAN HYDROBROMIDE 40 MG/1
40 TABLET, FILM COATED ORAL DAILY PRN
Qty: 27 TABLET | Refills: 3 | Status: SHIPPED | OUTPATIENT
Start: 2024-06-03

## 2024-06-24 DIAGNOSIS — G43.711 CHRONIC MIGRAINE WITHOUT AURA, INTRACTABLE, WITH STATUS MIGRAINOSUS: ICD-10-CM

## 2024-06-24 RX ORDER — RIMEGEPANT SULFATE 75 MG/75MG
75 TABLET, ORALLY DISINTEGRATING ORAL EVERY OTHER DAY
Qty: 16 TABLET | Refills: 8 | Status: CANCELLED | OUTPATIENT
Start: 2024-06-24 | End: 2025-03-21

## 2024-06-27 ENCOUNTER — SPECIALTY PHARMACY (OUTPATIENT)
Dept: PHARMACY | Facility: CLINIC | Age: 59
End: 2024-06-27

## 2024-07-25 ENCOUNTER — APPOINTMENT (OUTPATIENT)
Dept: NEUROLOGY | Facility: CLINIC | Age: 59
End: 2024-07-25
Payer: COMMERCIAL

## 2024-07-25 VITALS
HEART RATE: 104 BPM | HEIGHT: 62 IN | TEMPERATURE: 97.7 F | SYSTOLIC BLOOD PRESSURE: 100 MMHG | DIASTOLIC BLOOD PRESSURE: 70 MMHG | BODY MASS INDEX: 34.41 KG/M2 | WEIGHT: 187 LBS | RESPIRATION RATE: 20 BRPM

## 2024-07-25 DIAGNOSIS — R25.1 TREMOR: ICD-10-CM

## 2024-07-25 DIAGNOSIS — G47.00 INSOMNIA, UNSPECIFIED TYPE: ICD-10-CM

## 2024-07-25 DIAGNOSIS — G43.711 CHRONIC MIGRAINE WITHOUT AURA, INTRACTABLE, WITH STATUS MIGRAINOSUS: Primary | ICD-10-CM

## 2024-07-25 DIAGNOSIS — G47.33 OSA (OBSTRUCTIVE SLEEP APNEA): ICD-10-CM

## 2024-07-25 PROCEDURE — 99215 OFFICE O/P EST HI 40 MIN: CPT | Performed by: PSYCHIATRY & NEUROLOGY

## 2024-07-25 PROCEDURE — 3008F BODY MASS INDEX DOCD: CPT | Performed by: PSYCHIATRY & NEUROLOGY

## 2024-07-25 PROCEDURE — 1036F TOBACCO NON-USER: CPT | Performed by: PSYCHIATRY & NEUROLOGY

## 2024-07-25 RX ORDER — SEMAGLUTIDE 0.5 MG/.5ML
0.5 INJECTION, SOLUTION SUBCUTANEOUS
COMMUNITY

## 2024-07-25 ASSESSMENT — PATIENT HEALTH QUESTIONNAIRE - PHQ9
1. LITTLE INTEREST OR PLEASURE IN DOING THINGS: NOT AT ALL
2. FEELING DOWN, DEPRESSED OR HOPELESS: NOT AT ALL
SUM OF ALL RESPONSES TO PHQ9 QUESTIONS 1 AND 2: 0

## 2024-07-25 ASSESSMENT — ENCOUNTER SYMPTOMS
OCCASIONAL FEELINGS OF UNSTEADINESS: 0
LOSS OF SENSATION IN FEET: 0

## 2024-07-25 NOTE — ASSESSMENT & PLAN NOTE
Endorses better sleep, averaging 6-7 hours per night. However, she states she wakes frequently, & is restless, may get up 10 x per night.     Exercising 3 days per week- walking.  Consumes 2 cups of coffee in the morning, sometimes iced tea in the afternoon.   No late-night eating, eats last meal at 4 pm.  Does not wake up feeling rested. Is unsure if she has sleep apnea. She does endorse sometimes gasping/ snoring.   Will proceed with sleep study to rule out PK.

## 2024-07-25 NOTE — PROGRESS NOTES
"  SUBJECTIVE:   Miladys Jaramillo is a 59 y.o. female who presents with a history of chronic migraine. She reports being headache-free since May.       Migraines are in such good control   \" I feel like I don't even have migraines anymore\"    Last visit 5- was experiencing tremors and discontinued trazadone and valium as well as  Nurtec every other day. Would take PRN for migraine but has not needed  Tremors did not stop at that time. sleeping well with amitriptyline alone   Stopped topiramate and phentermine 3 weeks ago.   To start Wegovy 8-1-2024 for continued weight loss.    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 2 weeks ago.   Within 2 days of last day, her hand intention tremors stopped and is sleeping better.   Thinks stress and anxiety was causing the tremors.     Fell 2 weeks ago, Left leg gave out unexpectedly and fell on right knee which she has had replaced. Soreness. Does not know why leg gave out.     Sleeping better.  Averages 6-7 hours. Wakes frequently and restless. May get up 10 times per night.         Current Outpatient Medications   Medication Sig Dispense Refill    amitriptyline (Elavil) 25 mg tablet Take 1 tablet (25 mg) by mouth once daily at bedtime.      B.animalis,bifid,infantis,long (Probiotic 4X) 10-15 mg tablet,delayed release (DR/EC) Take by mouth.      eletriptan (Relpax) 40 mg tablet TAKE 1 TABLET DAILY AS NEEDED 27 tablet 3    magnesium oxide (Mag-Ox) 400 mg (241.3 mg magnesium) tablet 1 tablet (400 mg) 3 times a day.      melatonin 5 mg tablet Take 1 tablet (5 mg) by mouth.      multivitamin tablet Take 1 tablet by mouth once daily.      omega-3 acid ethyl esters (Lovaza) 1 gram capsule Take by mouth.      omeprazole (PriLOSEC) 40 mg DR capsule TAKE 1 CAPSULE BY MOUTH TWICE A DAY FOR 8 WEEKS THEN JUST TAKE 1 CAPSULE BY MOUTH EVERY DAY      pramipexole (Mirapex) 1 mg tablet Take 1 tablet (1 mg) " by mouth once daily at bedtime. 90 tablet 3    Refresh Optive Advanced, PF, 0.5-1-0.5 % dropperette INSTILL 1 DROP INTO BOTH EYES 4 TIMES A DAY      rimegepant (Nurtec ODT) 75 mg tablet,disintegrating Take 1 tablet (75 mg) by mouth if needed.      semaglutide, weight loss, (Wegovy) 0.5 mg/0.5 mL pen injector Inject 0.5 mg under the skin every 7 days. To start 8-1-2024      Zembrace Symtouch 3 mg/0.5 mL pen injector INJECT 1 PEN (3MG) UNDER THE SKIN AT ONSET OF MIGRAINE. MAY REPEAT DOSE IF NEEDED AFTER WAITING AT LEAST 1 HOUR FROM PREVIOUS DOSE. MAX 4 IN       No current facility-administered medications for this visit.         There are no associated abnormal neurological symptoms such as TIA's, loss of balance, loss of vision or speech, numbness or weakness on review. Past neurological history: negative for stroke, MS, epilepsy, or brain tumor.     OBJECTIVE:   In no distress, pleasant affect.     No noted tremor of bilateral hand       Lab on 05/30/2024   Component Date Value    Hemoglobin A1C 05/30/2024 5.4     Estimated Average Glucose 05/30/2024 108     Glucose 05/30/2024 91     Sodium 05/30/2024 141     Potassium 05/30/2024 4.4     Chloride 05/30/2024 108 (H)     Bicarbonate 05/30/2024 23     Anion Gap 05/30/2024 14     Urea Nitrogen 05/30/2024 24 (H)     Creatinine 05/30/2024 0.66     eGFR 05/30/2024 >90     Calcium 05/30/2024 9.7     Albumin 05/30/2024 4.3     Alkaline Phosphatase 05/30/2024 74     Total Protein 05/30/2024 6.9     AST 05/30/2024 15     Bilirubin, Total 05/30/2024 0.3     ALT 05/30/2024 16           ASSESSMENT/PLAN:      Problem List Items Addressed This Visit       Chronic migraine without aura, intractable, with status migrainosus - Primary     Migraines well-controlled with current regimen.     She endorses having a face lift in November, and migraine frequency has decreased with the disruption of the nerves associated with the surgery.         Insomnia     Endorses better sleep, averaging  6-7 hours per night. However, she states she wakes frequently, & is restless, may get up 10 x per night.     Exercising 3 days per week- walking.  Consumes 2 cups of coffee in the morning, sometimes iced tea in the afternoon.   No late-night eating, eats last meal at 4 pm.  Does not wake up feeling rested. Is unsure if she has sleep apnea. She does endorse sometimes gasping/ snoring.   Will proceed with sleep study to rule out PK.         PK (obstructive sleep apnea)     As noted, patient reports gasping/ snoring at night, poor sleep. Wakes up feeling un-rested.  Concern for PK. Will order sleep study.         Tremor     Previously reported at an appointment in 5/2024, endorsed a tremor while at work.    3 weeks ago, she stopped trazodone (prescribed by PCP) & phentermine.    No tremor observed upon assessment of  hands.    Discontinuation of mediations & less stress at work have aided in decreased hand tremor. Will follow up at next visit.            Follow up in 1 year.

## 2024-07-25 NOTE — ASSESSMENT & PLAN NOTE
As noted, patient reports gasping/ snoring at night, poor sleep. Wakes up feeling un-rested.  Concern for PK. Will order sleep study.

## 2024-07-25 NOTE — ASSESSMENT & PLAN NOTE
Migraines well-controlled with current regimen.     She endorses having a face lift in November, and migraine frequency has decreased with the disruption of the nerves associated with the surgery.

## 2024-07-25 NOTE — ASSESSMENT & PLAN NOTE
Previously reported at an appointment in 5/2024, endorsed a tremor while at work.    3 weeks ago, she stopped trazodone (prescribed by PCP) & phentermine.    No tremor observed upon assessment of  hands.    Discontinuation of mediations & less stress at work have aided in decreased hand tremor. Will follow up at next visit.

## 2024-08-12 DIAGNOSIS — G47.33 OSA (OBSTRUCTIVE SLEEP APNEA): Primary | ICD-10-CM

## 2024-08-27 ENCOUNTER — CLINICAL SUPPORT (OUTPATIENT)
Dept: SLEEP MEDICINE | Facility: HOSPITAL | Age: 59
End: 2024-08-27
Payer: COMMERCIAL

## 2024-08-27 ENCOUNTER — APPOINTMENT (OUTPATIENT)
Dept: SLEEP MEDICINE | Facility: CLINIC | Age: 59
End: 2024-08-27
Payer: COMMERCIAL

## 2024-08-27 VITALS — HEIGHT: 62 IN | WEIGHT: 187 LBS | BODY MASS INDEX: 34.41 KG/M2

## 2024-08-27 DIAGNOSIS — G47.33 OSA (OBSTRUCTIVE SLEEP APNEA): ICD-10-CM

## 2024-08-27 PROCEDURE — 9420000001 HC RT PATIENT EDUCATION 5 MIN

## 2024-08-27 NOTE — PROGRESS NOTES
Type of Study: HOME SLEEP STUDY - NOMAD     The patient received equipment and instructions for use of the [employer] Nomad HSAT device. The patient was instructed how to apply the effort belts, cannula, thermistor. It was also explained how the Nomad and oximeter components work.  The patient was asked to record their sleep for an 8-hour period.    The patient was informed of their responsibility for the device and acknowledged this by signing the HSAT device contract. The patient was asked to return the device on the next day and was shown where the drop off box was located in the hospital.     After the procedure, the patient/family was informed to follow up with ordering clinician for testing results.

## 2024-09-04 NOTE — RESULT ENCOUNTER NOTE
Please call the patient regarding her abnormal result. Her sleep apnea test was positive! I am glad she is seeing a sleep specialist tomorrow

## 2024-09-05 ENCOUNTER — APPOINTMENT (OUTPATIENT)
Dept: PULMONOLOGY | Facility: CLINIC | Age: 59
End: 2024-09-05
Payer: COMMERCIAL

## 2024-09-05 VITALS
SYSTOLIC BLOOD PRESSURE: 145 MMHG | BODY MASS INDEX: 33.68 KG/M2 | HEART RATE: 97 BPM | DIASTOLIC BLOOD PRESSURE: 87 MMHG | WEIGHT: 183 LBS | TEMPERATURE: 98.6 F | HEIGHT: 62 IN | OXYGEN SATURATION: 97 %

## 2024-09-05 DIAGNOSIS — G47.33 OSA (OBSTRUCTIVE SLEEP APNEA): Primary | ICD-10-CM

## 2024-09-05 PROCEDURE — 99205 OFFICE O/P NEW HI 60 MIN: CPT | Performed by: INTERNAL MEDICINE

## 2024-09-05 PROCEDURE — 3008F BODY MASS INDEX DOCD: CPT | Performed by: INTERNAL MEDICINE

## 2024-09-05 PROCEDURE — 1036F TOBACCO NON-USER: CPT | Performed by: INTERNAL MEDICINE

## 2024-09-05 NOTE — PROGRESS NOTES
Subjective   Patient ID: Miladys Jaramillo is a 59 y.o. female who presents for Sleeping Problem.  HPI  This is a 59-year-old  female who was sent to me for evaluation and referral for her sleep study.  The patient had a home sleep apnea test which indicated that the patient had severe obstructive sleep apnea with oxygen desaturations less than or equal to 88% for an hour and a half.  Patient does have a history also of some periodic limb movements of sleep.  She complains of some gastroesophageal reflux and has been told that she snores and has had stop breathing episodes for about 1 year.  She does have gasping or choking during sleep.  She reports having some morning headaches.  She does take naps during the day but not intentional.  Her ESS was 8 today.  She feels overall that her sleep is poor.  She usually goes to bed at 9 PM and is up at 4:30 AM.  She assisted her  with he is job as a  from 9 AM to 12 noon.  She has no difficulty in initiating sleep but after couple hours she will awaken and then have periods of wakefulness for every hour until she gets up in the morning.  She does have mild tremors and migraines.  She has had 3 knee replacements, cholecystectomy, appendectomy, left foot surgery, tonsillectomy and adenoidectomy and total abdominal hysterectomy for uterine fibroids.  Patient admits to half pack per day x 23 years smoking history and quit in 2009.  She admits to social alcohol usage.  She was born and raised in [place].  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  Review of Systems  Patient reports about a 20 pound weight loss over the last 4 months.  She has a history of GERD symptoms and has a sore muscles and joints related to osteoarthritis.  She has been treated for anxiety.  She does have spring and fall allergy symptoms.  Objective   Physical Exam  HEENT the patient has a class IV airway and a slightly retrognathic chin.  Pulmonary, lungs were clear to  auscultation.  Cardio, heart sounds were regular rate and rhythm.  GI, bowel sounds were heard in all quadrants with no tenderness on palpation of the abdomen.  Extremities, no pretibial edema, cyanosis or clubbing.  Skin, no abnormal rashes or skin lesions noted  Psych, the patient is alert and oriented x 3 and denies any shortness of breath with her usual daily activities.  Assessment/Plan        Impressions:  1.  Severe obstructive sleep apnea syndrome based on her home study from 8/27/2024.  This indicated a JANIS 3% of 66.2/h  Recommendations:  1.  Will schedule the patient for a in lab CPAP titration and oxygen titration if necessary.  (Split-night study)  2.  Follow-up with the patient in about 3 weeks after starting on the therapy.      This note was transcribed using the Dragon Dictation system.  There may be grammatical, punctuation, or verbiage errors that occur with voice recognition programs.    Cirilo Johnson DO 09/05/24 12:46 PM

## 2024-09-16 ENCOUNTER — CLINICAL SUPPORT (OUTPATIENT)
Dept: SLEEP MEDICINE | Facility: CLINIC | Age: 59
End: 2024-09-16
Payer: COMMERCIAL

## 2024-09-16 VITALS
SYSTOLIC BLOOD PRESSURE: 153 MMHG | TEMPERATURE: 98.1 F | DIASTOLIC BLOOD PRESSURE: 97 MMHG | HEIGHT: 62 IN | OXYGEN SATURATION: 95 % | BODY MASS INDEX: 33.67 KG/M2 | HEART RATE: 101 BPM | WEIGHT: 182.98 LBS

## 2024-09-16 DIAGNOSIS — G47.33 OSA (OBSTRUCTIVE SLEEP APNEA): ICD-10-CM

## 2024-09-16 PROCEDURE — 95810 POLYSOM 6/> YRS 4/> PARAM: CPT | Performed by: INTERNAL MEDICINE

## 2024-09-16 ASSESSMENT — SLEEP AND FATIGUE QUESTIONNAIRES
HOW LIKELY ARE YOU TO NOD OFF OR FALL ASLEEP WHEN YOU ARE A PASSENGER IN A CAR FOR AN HOUR WITHOUT A BREAK: HIGH CHANCE OF DOZING
HOW LIKELY ARE YOU TO NOD OFF OR FALL ASLEEP WHILE SITTING QUIETLY AFTER LUNCH WITHOUT ALCOHOL: SLIGHT CHANCE OF DOZING
HOW LIKELY ARE YOU TO NOD OFF OR FALL ASLEEP WHILE SITTING AND READING: SLIGHT CHANCE OF DOZING
SITING INACTIVE IN A PUBLIC PLACE LIKE A CLASS ROOM OR A MOVIE THEATER: SLIGHT CHANCE OF DOZING
HOW LIKELY ARE YOU TO NOD OFF OR FALL ASLEEP WHILE WATCHING TV: MODERATE CHANCE OF DOZING
ESS-CHAD TOTAL SCORE: 10
HOW LIKELY ARE YOU TO NOD OFF OR FALL ASLEEP WHILE LYING DOWN TO REST IN THE AFTERNOON WHEN CIRCUMSTANCES PERMIT: MODERATE CHANCE OF DOZING
HOW LIKELY ARE YOU TO NOD OFF OR FALL ASLEEP WHILE SITTING AND TALKING TO SOMEONE: WOULD NEVER DOZE
HOW LIKELY ARE YOU TO NOD OFF OR FALL ASLEEP IN A CAR, WHILE STOPPED FOR A FEW MINUTES IN TRAFFIC: WOULD NEVER DOZE

## 2024-09-17 NOTE — PROGRESS NOTES
[facility] TECH NOTE:     Patient: Miladys Jaramillo   MRN//AGE: [id number]  1965  59 y.o.   Technologist: JEAN-CLAUDE CANO   Room: 2   Service Date: 2024        Sleep Testing Location: [place] [address]    [place]: 10    TECHNOLOGIST SLEEP STUDY PROCEDURE NOTE:   This sleep study is being conducted according to the policies and procedures outlined by the AAS accreditation standards.  The sleep study procedure and processes involved during this appointment was explained to the patient/patient’s family, questions were answered. The patient/family verbalized understanding.      The patient is a 59 y.o. year old female scheduled for a Diagnostic PSG Split night with montage of  PSG MASTER      The study that was ultimately completed was a Diagnostic PSG with montage of  PSG MASTER      The full study Was completed.  Patient questionnaires completed?: yes     Consents signed? yes    Initial Fall Risk Screening:     Miladys has fallen in the last 6 months.  Miladys does not have a fear of falling. She does not need assistance with sitting, standing, or walking. she does not need assistance walking in her home. she does not need assistance in an unfamiliar setting. The patient is notusing an assistive device.     Brief Study observations: Ms. Jaramillo was educated on a split study per order. She did not meet criteria in the time frame for a split. During her study she experienced hypopneas and frequent awakenings.    Deviation to order/protocol and reason: Did not qualify for Split Study        After the procedure, the patient/family was informed to ensure followup with ordering clinician for testing results.      Technologist: JEAN-CLAUDE CANO

## 2024-10-02 ENCOUNTER — APPOINTMENT (OUTPATIENT)
Dept: PULMONOLOGY | Facility: CLINIC | Age: 59
End: 2024-10-02
Payer: COMMERCIAL

## 2024-10-02 VITALS
TEMPERATURE: 97.5 F | OXYGEN SATURATION: 94 % | HEART RATE: 105 BPM | DIASTOLIC BLOOD PRESSURE: 79 MMHG | SYSTOLIC BLOOD PRESSURE: 112 MMHG | BODY MASS INDEX: 33.31 KG/M2 | HEIGHT: 62 IN | WEIGHT: 181 LBS

## 2024-10-02 DIAGNOSIS — G47.33 OBSTRUCTIVE SLEEP APNEA: Primary | ICD-10-CM

## 2024-10-02 PROCEDURE — 1036F TOBACCO NON-USER: CPT | Performed by: INTERNAL MEDICINE

## 2024-10-02 PROCEDURE — 3008F BODY MASS INDEX DOCD: CPT | Performed by: INTERNAL MEDICINE

## 2024-10-02 PROCEDURE — 99213 OFFICE O/P EST LOW 20 MIN: CPT | Performed by: INTERNAL MEDICINE

## 2024-10-02 NOTE — PROGRESS NOTES
Subjective   Patient ID: Miladys Jaramillo is a 59 y.o. female who presents for No chief complaint on file..  HPI  Patient was seen today in the office and I reviewed with her the results of her sleep study done at [facility].  This diagnostic study was done on 9/16/2024.  Patient did not have a adequate time to meet the criteria for a split study.  Her RDI 3% was 30.4.  Recommendations that I made at that time was for her to be placed on auto adjusting CPAP with settings of 5 to 15 cm of water.  She also has not been fitted with a CPAP mask.  Review of Systems  No change in review of systems  Objective   Physical Exam  Not done today  Assessment/Plan        Impressions:  1.  Severe obstructive sleep apnea.  Recommendations:  1.  Arrange for the patient to have CPAP equipment and a mask fitting.  Ideally placing her on a auto adjusting CPAP with settings of 5 to 15 cm of water.  2.  Follow-up in the office after 3 weeks of use of the APAP.  3.  The patient is scheduled to be set up through [facility]  In [place].    Cirilo Johnson DO 10/02/24 9:39 AM

## 2024-10-31 ENCOUNTER — APPOINTMENT (OUTPATIENT)
Dept: PULMONOLOGY | Facility: CLINIC | Age: 59
End: 2024-10-31
Payer: COMMERCIAL

## 2024-10-31 VITALS
HEIGHT: 62 IN | BODY MASS INDEX: 32.76 KG/M2 | SYSTOLIC BLOOD PRESSURE: 110 MMHG | HEART RATE: 102 BPM | OXYGEN SATURATION: 96 % | DIASTOLIC BLOOD PRESSURE: 79 MMHG | TEMPERATURE: 97.3 F | WEIGHT: 178 LBS

## 2024-10-31 DIAGNOSIS — Z99.89 HISTORY OF CONTINUOUS POSITIVE AIRWAY PRESSURE (CPAP) THERAPY AT HOME: ICD-10-CM

## 2024-10-31 DIAGNOSIS — G47.33 SEVERE OBSTRUCTIVE SLEEP APNEA: Primary | ICD-10-CM

## 2024-10-31 PROCEDURE — 1036F TOBACCO NON-USER: CPT | Performed by: INTERNAL MEDICINE

## 2024-10-31 PROCEDURE — 3008F BODY MASS INDEX DOCD: CPT | Performed by: INTERNAL MEDICINE

## 2024-10-31 PROCEDURE — 99214 OFFICE O/P EST MOD 30 MIN: CPT | Performed by: INTERNAL MEDICINE

## 2024-12-12 ENCOUNTER — APPOINTMENT (OUTPATIENT)
Dept: PULMONOLOGY | Facility: CLINIC | Age: 59
End: 2024-12-12
Payer: COMMERCIAL

## 2024-12-12 VITALS
SYSTOLIC BLOOD PRESSURE: 135 MMHG | OXYGEN SATURATION: 98 % | TEMPERATURE: 98.6 F | DIASTOLIC BLOOD PRESSURE: 100 MMHG | HEIGHT: 62 IN | WEIGHT: 177 LBS | BODY MASS INDEX: 32.57 KG/M2 | HEART RATE: 103 BPM

## 2024-12-12 DIAGNOSIS — Z99.89 HISTORY OF CONTINUOUS POSITIVE AIRWAY PRESSURE (CPAP) THERAPY AT HOME: ICD-10-CM

## 2024-12-12 DIAGNOSIS — G47.33 OBSTRUCTIVE SLEEP APNEA: Primary | ICD-10-CM

## 2024-12-12 PROCEDURE — 99213 OFFICE O/P EST LOW 20 MIN: CPT | Performed by: INTERNAL MEDICINE

## 2024-12-12 PROCEDURE — 3008F BODY MASS INDEX DOCD: CPT | Performed by: INTERNAL MEDICINE

## 2024-12-12 PROCEDURE — 1036F TOBACCO NON-USER: CPT | Performed by: INTERNAL MEDICINE

## 2024-12-12 NOTE — PROGRESS NOTES
Subjective   Patient ID: Miladys Jaramillo is a 59 y.o. female who presents for No chief complaint on file..  HPI  Patient was seen today on a 6-week follow-up visit.  She has been on APAP therapy at 15/5 cm of water.  She has use device 98% of the time since last seen.  And her average nightly use is 7 hours 29 minutes.  Her AHI is 3.2.  Her average leak was 10.5 L/min.  She has worn the device every night except 1 since the last time we saw her.  She was set up by the [email or web address] on 10/9/2024.  Review of Systems  She denies any problems with nasal dryness, sore throat, snoring or fatigue.  Objective   Physical Exam  Physical exam was not done today.  Assessment/Plan        Impressions:  1.  Severe obstructive sleep apnea.  2.  APAP therapy.  Recommendations:  1.  Follow-up with the patient in 4 months.  2.  Continue with cleaning her equipment on a regular basis.  3.  I told the patient to contact her office if she has any new problems with her APAP therapy.      This note was transcribed using the Dragon Dictation system.  There may be grammatical, punctuation, or verbiage errors that occur with voice recognition programs.    Cirilo Johnson,  12/12/24 9:49 AM

## 2025-02-28 DIAGNOSIS — G25.81 RESTLESS LEG SYNDROME: ICD-10-CM

## 2025-02-28 RX ORDER — PRAMIPEXOLE DIHYDROCHLORIDE 1 MG/1
1 TABLET ORAL NIGHTLY
Qty: 90 TABLET | Refills: 3 | Status: SHIPPED | OUTPATIENT
Start: 2025-02-28

## 2025-04-15 ENCOUNTER — APPOINTMENT (OUTPATIENT)
Dept: PULMONOLOGY | Facility: CLINIC | Age: 60
End: 2025-04-15
Payer: COMMERCIAL

## 2025-05-14 ENCOUNTER — APPOINTMENT (OUTPATIENT)
Dept: PULMONOLOGY | Facility: CLINIC | Age: 60
End: 2025-05-14
Payer: COMMERCIAL

## 2025-06-26 ENCOUNTER — APPOINTMENT (OUTPATIENT)
Dept: PULMONOLOGY | Facility: CLINIC | Age: 60
End: 2025-06-26
Payer: COMMERCIAL

## 2025-06-26 VITALS
HEART RATE: 118 BPM | SYSTOLIC BLOOD PRESSURE: 114 MMHG | HEIGHT: 62 IN | BODY MASS INDEX: 36.95 KG/M2 | TEMPERATURE: 98.2 F | OXYGEN SATURATION: 95 % | DIASTOLIC BLOOD PRESSURE: 83 MMHG | WEIGHT: 200.8 LBS

## 2025-06-26 DIAGNOSIS — Z99.89 HISTORY OF CONTINUOUS POSITIVE AIRWAY PRESSURE (CPAP) THERAPY AT HOME: Primary | ICD-10-CM

## 2025-06-26 PROCEDURE — 99214 OFFICE O/P EST MOD 30 MIN: CPT | Performed by: INTERNAL MEDICINE

## 2025-06-26 PROCEDURE — 3008F BODY MASS INDEX DOCD: CPT | Performed by: INTERNAL MEDICINE

## 2025-06-27 NOTE — PROGRESS NOTES
Subjective   Patient ID: Miladys Jaramillo is a 60 y.o. female who presents for No chief complaint on file..  HPI  Patient was seen for a follow-up visit in the office.  She feels that her right knee was displaced but gradually has improved.  I discussed with her the results of her surgery on the right knee.  Her compliance report from 6/16/2025 indicated that there was 98% usage of the CPAP and 71% of the time was greater than 4 hours.  Her average use was 5 hours and 15 minutes.  Her AHI was 3.0.  Review of Systems  Patient's medical provider was amelie carrero and the PAP set up date was 10/9/2024.  Objective   Physical Exam  Physical exam was not done today.  Assessment/Plan        Impressions:  1.  Continue with CPAP therapy.  2.  Follow-up with the patient in 4 months.      This note was transcribed using the Dragon Dictation system.  There may be grammatical, punctuation, or verbiage errors that occur with voice recognition programs.    Cirilo Johnson,  06/27/25 2:31 PM

## 2025-07-10 ENCOUNTER — APPOINTMENT (OUTPATIENT)
Dept: NEUROLOGY | Facility: CLINIC | Age: 60
End: 2025-07-10
Payer: COMMERCIAL

## 2025-07-10 VITALS
TEMPERATURE: 96 F | RESPIRATION RATE: 16 BRPM | SYSTOLIC BLOOD PRESSURE: 122 MMHG | DIASTOLIC BLOOD PRESSURE: 84 MMHG | WEIGHT: 197 LBS | HEART RATE: 80 BPM | HEIGHT: 62 IN | BODY MASS INDEX: 36.25 KG/M2

## 2025-07-10 DIAGNOSIS — G43.109 MIGRAINE WITH AURA AND WITHOUT STATUS MIGRAINOSUS, NOT INTRACTABLE: Primary | ICD-10-CM

## 2025-07-10 DIAGNOSIS — G62.9 NEUROPATHY: ICD-10-CM

## 2025-07-10 PROCEDURE — 3008F BODY MASS INDEX DOCD: CPT | Performed by: PSYCHIATRY & NEUROLOGY

## 2025-07-10 PROCEDURE — 99213 OFFICE O/P EST LOW 20 MIN: CPT | Performed by: PSYCHIATRY & NEUROLOGY

## 2025-07-10 RX ORDER — LIDOCAINE 50 MG/G
1 PATCH TOPICAL DAILY
Qty: 30 PATCH | Refills: 2 | Status: SHIPPED | OUTPATIENT
Start: 2025-07-10

## 2025-07-10 RX ORDER — CELECOXIB 200 MG/1
200 CAPSULE ORAL DAILY
COMMUNITY

## 2025-07-10 RX ORDER — AMITRIPTYLINE HYDROCHLORIDE 10 MG/1
10 TABLET, FILM COATED ORAL NIGHTLY
Qty: 30 TABLET | Refills: 11 | Status: SHIPPED | OUTPATIENT
Start: 2025-07-10 | End: 2026-07-10

## 2025-07-10 RX ORDER — PREGABALIN 25 MG/1
25 CAPSULE ORAL DAILY
COMMUNITY

## 2025-07-10 ASSESSMENT — PATIENT HEALTH QUESTIONNAIRE - PHQ9
2. FEELING DOWN, DEPRESSED OR HOPELESS: NOT AT ALL
SUM OF ALL RESPONSES TO PHQ9 QUESTIONS 1 AND 2: 0
1. LITTLE INTEREST OR PLEASURE IN DOING THINGS: NOT AT ALL

## 2025-07-10 ASSESSMENT — PAIN SCALES - GENERAL: PAINLEVEL_OUTOF10: 0-NO PAIN

## 2025-07-10 NOTE — PROGRESS NOTES
Experiencing 0 migraines per month. 1 headaches. Last severe migraine was prior to migraine surgery. Has some that have visual disturbance of blurry vision triggered by fatigue or poor sleep.   Treating migraines with tylenol (a few times a month) and eletriptan ( 1 time a month) No side effect and relieves in 30 minutes  Has to repeat treatment 0% of time.    Migraine occurs front in head  Associated symptoms are vision affected, nausea   Triggers for migraines are lack of sleep and changing routine    Sleeping 7 hours per night on average.  No trouble falling asleep  No trouble staying asleep.   Feels rested on awakening? Some lack of energy, feeling tired    Anxiety or depression occurrence :NO    Recent knee replacement had a revision had a recall. Had an instability and pain and had a samina replacement with no weight bearing and then partial weight bearing. Has terrible nerve pain and ?RSD is on Lyrica.     Amitriptyline for reflux. Would like to try to decrease      To review what we discussed today   Assessment/Plan   Problem List Items Addressed This Visit    None  Visit Diagnoses         Migraine with aura and without status migrainosus, not intractable    -  Primary    Relevant Orders    Follow Up In Neurology      Neuropathy        Relevant Medications    lidocaine (Lidoderm) 5 % patch    amitriptyline (Elavil) 10 mg tablet            Your next appointment with me is 1 year    Come join my team in the upcoming [facility] [place] 2025 - [email or web address]    Thank you for visiting the office of Dr Caterina Salas. It was a pleasure working with you today.   [address] Mon-Thursday  [facility] Fridays  Our office phone number is [phone]. You can use this number to leave messages for Regina or to schedule or reschedule an appointment or request refills.  If you were seen on Thursday  afternoon or Friday our office will call on Monday or Tuesday to reschedule your appointment. If you do not receive a call please call us.   We are also available for messages on my chart. We make every effort to respond to your concerns by the end of the next business day.

## 2025-07-29 ENCOUNTER — APPOINTMENT (OUTPATIENT)
Dept: PULMONOLOGY | Facility: CLINIC | Age: 60
End: 2025-07-29
Payer: COMMERCIAL